# Patient Record
Sex: MALE | Race: BLACK OR AFRICAN AMERICAN | Employment: UNEMPLOYED | URBAN - METROPOLITAN AREA
[De-identification: names, ages, dates, MRNs, and addresses within clinical notes are randomized per-mention and may not be internally consistent; named-entity substitution may affect disease eponyms.]

---

## 2017-01-10 ENCOUNTER — OFFICE VISIT (OUTPATIENT)
Dept: RHEUMATOLOGY | Age: 67
End: 2017-01-10

## 2017-01-10 VITALS
WEIGHT: 187.8 LBS | HEIGHT: 68 IN | RESPIRATION RATE: 20 BRPM | HEART RATE: 68 BPM | DIASTOLIC BLOOD PRESSURE: 96 MMHG | OXYGEN SATURATION: 100 % | BODY MASS INDEX: 28.46 KG/M2 | TEMPERATURE: 97.6 F | SYSTOLIC BLOOD PRESSURE: 170 MMHG

## 2017-01-10 DIAGNOSIS — M1A.39X1 CHRONIC GOUT DUE TO RENAL IMPAIRMENT OF MULTIPLE SITES WITH TOPHUS: Primary | ICD-10-CM

## 2017-01-10 DIAGNOSIS — N18.5 CKD (CHRONIC KIDNEY DISEASE) STAGE 5, GFR LESS THAN 15 ML/MIN (HCC): ICD-10-CM

## 2017-01-10 DIAGNOSIS — D47.2 MGUS (MONOCLONAL GAMMOPATHY OF UNKNOWN SIGNIFICANCE): ICD-10-CM

## 2017-01-10 DIAGNOSIS — M17.0 PRIMARY OSTEOARTHRITIS OF BOTH KNEES: ICD-10-CM

## 2017-01-10 NOTE — MR AVS SNAPSHOT
Visit Information Date & Time Provider Department Dept. Phone Encounter #  
 1/10/2017  4:00 PM Karen Terry MD [facility] [id number] Follow-up Instructions Return in about 3 months (around 4/10/2017). Upcoming Health Maintenance Date Due Hepatitis C Screening 1950 FOBT Q 1 YEAR AGE 50-75 10/23/1998 ZOSTER VACCINE AGE 60> 10/23/2008 GLAUCOMA SCREENING Q2Y 10/23/2013 Pneumococcal 65+ High/Highest Risk (1 of 2 - PCV13) 10/23/2013 MEDICARE YEARLY EXAM 10/23/2013 INFLUENZA AGE 9 TO ADULT 8/1/2016 DTaP/Tdap/Td series (2 - Td) 9/11/2023 Allergies as of 1/10/2017  Review Complete On: 1/10/2017 By: Keyshawn Newell LPN Severity Noted Reaction Type Reactions Amlodipine  07/23/2013    Swelling Other Medication  05/24/2013    Other (comments) Eye Drop 2002- Caused pain, redness and discomfort Current Immunizations  Reviewed on 10/10/2016 Name Date Tdap 9/11/2013 Not reviewed this visit You Were Diagnosed With   
  
 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   
 CKD (chronic kidney disease) stage 5, GFR less than 15 ml/min ([facility])     ICD-10-CM: N18.5 ICD-9-CM: 585.5 MGUS (monoclonal gammopathy of unknown significance)     ICD-10-CM: A39.7 ICD-9-CM: 273.1 Primary osteoarthritis of both knees     ICD-10-CM: M17.0 ICD-9-CM: 715.16 Vitals BP Pulse Temp Resp Height(growth percentile) Weight(growth percentile) (!) 170/96 (BP 1 Location: Left arm, BP Patient Position: Sitting) 68 97.6 °F (36.4 °C) (Oral) 20 5' 8\" (1.727 m) 187 lb 12.8 oz (85.2 kg) SpO2 BMI Smoking Status 100% 28.55 kg/m2 Former Smoker Vitals History BMI and BSA Data Body Mass Index Body Surface Area 28.55 kg/m 2 2.02 m 2 Preferred Pharmacy Pharmacy Name Phone [facility] [address] [facility] [phone] Your Updated Medication List  
  
   
This list is accurate as of: 1/10/17  4:17 PM.  Always use your most recent med list.  
  
  
  
  
 ascorbic acid (vitamin C) 500 mg tablet Commonly known as:  VITAMIN C Take 1,500 mg by mouth daily. bumetanide 1 mg tablet Commonly known as:  Sarina Achilles TAKE ONE TABLET BY MOUTH ONCE DAILY FOR BLOOD PRESSURE  
  
 cholecalciferol 1,000 unit tablet Commonly known as:  VITAMIN D3 Take 5,000 Units by mouth daily. COREG 12.5 mg tablet Generic drug:  carvedilol Take 25 mg by mouth two (2) times daily (with meals). febuxostat 40 mg Tab tablet Commonly known as:  Jacinda Liter Take 40 mg by mouth daily. simvastatin 40 mg tablet Commonly known as:  ZOCOR  
TAKE ONE TABLET BY MOUTH NIGHTLY We Performed the Following URIC ACID [id number] CPT(R)] Follow-up Instructions Return in about 3 months (around 4/10/2017). Introducing [facility]! Dear Daniel El: 
Thank you for requesting a [employer] account. Our records indicate that you already have an active Kirom[employer] account. You can access your account anytime at [email or web address] Did you know that you can access your hospital and ER discharge instructions at any time in Ki[employer]ic? You can also review all of your test results from your hospital stay or ER visit. Additional Information If you have questions, please visit the Frequently Asked Questions section of the Kirom[employer] website at [email or web address] Remember, Ki[employer]ic is NOT to be used for urgent needs. For medical emergencies, dial 911. Now available from your iPhone and Android! Please provide this summary of care documentation to your next provider. Your primary care clinician is listed as Vivien Saenz.  If you have any questions after today's visit, please call [phone].

## 2017-01-10 NOTE — PROGRESS NOTES
REASON FOR VISIT    This is a follow-up visit for Mr. Caleb Reyes for gouty arthritis. 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 Caleb Reyes returns for a follow-up visit. Most recent uric acid from 10/10/2016 was 4.2 mg/dL (previously 4.2, 3.8 mg/dL). He has had not flare since his last visit. He continues on Uloric 40 mg daily . The patient has not had any interval hospital admissions, infections, or surgeries. REVIEW OF SYSTEMS    A comprehensive review of systems was performed and pertinent results are documented in the HPI, review of systems is otherwise non-contributory. PAST MEDICAL HISTORY    He has a past medical history of Anemia; Benign hypertensive renal disease (3/18/16); Chronic kidney disease, stage III (moderate) (3/18/16); Cold sore; Gout (2013); Gout; Hiatal hernia; Hyperlipidemia; Hypertension; MGUS (monoclonal gammopathy of unknown significance) (3/22/2016); Persistent proteinuria; Prediabetes; Proteinuria (3/18/16); Right knee pain (4/13/16); and Vitamin D deficiency.     FAMILY HISTORY    His family history includes Dementia (age of onset: 80) in his mother; Hypertension in his father; Stroke (age of onset: 64) in his father. SOCIAL HISTORY    He reports that he quit smoking about 30 years ago. His smoking use included Cigarettes. He quit after 3.00 years of use. He has never used smokeless tobacco. He reports that he does not drink alcohol or use illicit drugs. IMMUNIZATIONS  Immunization History   Administered Date(s) Administered    Tdap 09/11/2013       MEDICATIONS    Current Outpatient Prescriptions   Medication Sig Dispense Refill    carvedilol (COREG) 12.5 mg tablet Take 25 mg by mouth two (2) times daily (with meals).  simvastatin (ZOCOR) 40 mg tablet TAKE ONE TABLET BY MOUTH NIGHTLY 90 Tab 0    bumetanide (BUMEX) 1 mg tablet TAKE ONE TABLET BY MOUTH ONCE DAILY FOR BLOOD PRESSURE (Patient taking differently: patient takes 3 times  a week) 30 Tab 5    febuxostat (ULORIC) 40 mg tab tablet Take 40 mg by mouth daily.  cholecalciferol (VITAMIN D3) 1,000 unit tablet Take 5,000 Units by mouth daily.  ascorbic acid (VITAMIN C) 500 mg tablet Take 1,500 mg by mouth daily. ALLERGIES    Allergies   Allergen Reactions    Amlodipine Swelling    Other Medication Other (comments)     Eye Drop 2002- Caused pain, redness and discomfort       PHYSICAL EXAMINATION    Visit Vitals    BP (!) 170/96 (BP 1 Location: Left arm, BP Patient Position: Sitting)    Pulse 68    Temp 97.6 °F (36.4 °C) (Oral)    Resp 20    Ht 5' 8\" (1.727 m)    Wt 187 lb 12.8 oz (85.2 kg)    SpO2 100%    BMI 28.55 kg/m2     Body mass index is 28.55 kg/(m^2). General: Patient is alert, oriented x 3, not in acute distress    HEENT:   Sclerae are not injected and appear moist.  Oral mucous membranes are moist, there are no ulcers present. There is no alopecia. Neck is supple, there is no lymphadenopathy. Cardiovascular:  Heart is regular rate and rhythm, no murmurs. Chest:  Lungs are clear to auscultation bilaterally.  No rhonchi, wheezes, or crackles. Abdomen:  Soft, non-tender. Extremities:  Free of clubbing, cyanosis, edema    Neurological exam:  No focal sensory deficits, muscle strength is full in upper and lower extremities     Skin exam:  There are no rashes, no alopecia, no tophi, no discoid lesions, no active Raynaud's, no livedo reticularis, no periungual erythema. Musculoskeletal exam:  A comprehensive musculoskeletal exam was performed for all joints of each upper and lower extremity and assessed for swelling, tenderness and range of motion. Positive results are documented as below:     No synovitis. DATA REVIEW    Laboratory    The following laboratory results were reviewed and discussed with the patient:    Abstract on 12/08/2016   Component Date Value    Creatinine, External 10/27/2016 4.27    Abstract on 10/27/2016   Component Date Value    Creatinine, External 09/20/2016 4.30    Office Visit on 10/10/2016   Component Date Value    Uric acid 10/10/2016 4.2        Imaging    Musculoskeletal Ultrasound    Ultrasound of the right 1st toe. Indication: joint pain. (10/10/2016)  Using a GE Logiq e with 18 Mhz probe, limited views of the right 1st MTP dorsolateral were obtained. This revealed hypoechoic inhomogenous circumscribed collection with acoustic shadowing within the joint space. No doppler. The tendons were normal. Bony contours were regular dorsally but irregularly on lateral view with an periartiuclar erosion seen. No double contour sign. Impression: tophaceous erosive gout     Ultrasound of the left 1st toe. Indication: joint pain. (10/10/2016)  Using a GE Logiq e with 18 Mhz probe, limited views of the left 1st MTP were obtained. This revealed a hypoechoic inhomogenous collection within the joint space extending laterally.  The tendons were normal. Bony contours were irregular with an hyperechoic extension from the dorsal lockhart of the 1st MTP with a periarticular erosion seen on lateral view. There was a double contour sign seen on lateral view. Impression: tophaceous erosive gout with possibly calcified tophi     Ultrasound of the right knee. Indication: joint swelling. (10/10/2016)  Using a Ocho Globaliq e with 12 Mhz probe, limited views of the suprapatellar were obtained. This revealed no abnormal within the joint space. The tendons were normal.Patellar bony contours were irregular. There were no soft tissue masses noted. No double contour sign  Impression: degenerative patella. Radiographs    Right Knee 4/17/2016: small joint effusion and prepatellar soft tissue swelling with no acute fracture. Right Knee 4/12/2016: spurring/enthesopathy appearance of the superior patellar pole new since 2006, with regional soft tissue swelling suggested. There is no apparent fracture or effusion. There is mild osteoarthrosis. Small intra-articular loose body is possible. CT Imaging    None    MR Imaging    None    DXA     None    Ultrasound    Renal Utrasound 3/23/2016: evidence of chronic medical renal disease. Multiple bilateral renal cysts. No hydronephrosis. Other Imaging    Skeletal Survey 11/01/2010: Minimal to mild osteopenia. Mild DJD. No focal findings    PATHOLOGY    Bone Marrow Biopsy from the right iliac crest biopsy and aspiration 3/01/2016: Normocellular marrow with trilineage hematopoiesis. No morphologic or flow cytometric evidence for malignancy or high grade dysplasia. Storage iron present without pathologic erythroid iron. Normal male karyotype. ASSESSMENT AND PLAN    This is a follow-up visit for Mr. Janessa Hunt. 1) Chronic Tophaceous Erosive Gout. His uric acid was 4.2 mg/dL in 9/19/2016 (previously 4.2, 3.8 mg/dL). He is doing well on Uloric 40 mg daily. No interval flares. I will check his uric acid level today.    2) CKD Stage V. He follows with Dr. Gisela Marroquin. His renal function is worsening and will need dialysis. See #1.     3) Monoclonal Gammopathy of Undetermined Significance. He follows with Dr. Mark Anthony Cerna.    4) Proteinuria. See #2. 5) Bilateral Knee Osteoarthritis. This was not an active issue today. The patient voiced understanding of the aforementioned assessment and plan. Summary of plan was provided in the After Visit Summary patient instructions.      TODAY'S ORDERS    Orders Placed This Encounter    URIC ACID     Future Appointments  Date Time Provider Mali Harrison   4/11/2017 4:00 PM MD Sadaf Munroe MD, [address] [facility]    Adult Rheumatology   Musculoskeletal Ultrasound Certified  [address] [place] [address], [address] [facility]   Phone [phone]  Fax [phone]

## 2017-01-11 LAB — URATE SERPL-MCNC: 3.8 MG/DL (ref 3.7–8.6)

## 2017-01-19 LAB — CREATININE, EXTERNAL: 5.29

## 2017-01-31 ENCOUNTER — TELEPHONE (OUTPATIENT)
Dept: SURGERY | Age: 67
End: 2017-01-31

## 2017-01-31 DIAGNOSIS — Z01.818 PRE-OP TESTING: Primary | ICD-10-CM

## 2017-01-31 NOTE — TELEPHONE ENCOUNTER
Spoke with Mr Christopher He. Dr Miguel Monterroso spoke with Dr Sergio Johns. Called pt to schedule surgery for laparoscopic insertion of peritoneal dialysis catheter. He will check with work & confirm date. Pt also needs EKG, order placed & he will go by 2/10/17.

## 2017-02-01 ENCOUNTER — HOSPITAL ENCOUNTER (OUTPATIENT)
Dept: NON INVASIVE DIAGNOSTICS | Age: 67
Discharge: HOME | End: 2017-02-01
Payer: MEDICARE

## 2017-02-01 DIAGNOSIS — Z01.818 PRE-OP TESTING: ICD-10-CM

## 2017-02-01 PROCEDURE — 93005 ELECTROCARDIOGRAM TRACING: CPT

## 2017-02-02 LAB
ATRIAL RATE: 66 BPM
CALCULATED P AXIS, ECG09: 59 DEGREES
CALCULATED R AXIS, ECG10: 1 DEGREES
CALCULATED T AXIS, ECG11: -5 DEGREES
DIAGNOSIS, 93000: NORMAL
P-R INTERVAL, ECG05: 158 MS
Q-T INTERVAL, ECG07: 394 MS
QRS DURATION, ECG06: 84 MS
QTC CALCULATION (BEZET), ECG08: 413 MS
VENTRICULAR RATE, ECG03: 66 BPM

## 2017-02-02 NOTE — TELEPHONE ENCOUNTER
Received a med refill request from pt's [address]  for med Simvastatin 40mg. I called the pt to check and see if he is still a pt here at [facility] practice since he was last seen by Dr. Jess Ramírez in 02/2016. Pt states that he is still a pt here at [facility] and has switched to see Dr. Lisa Melchor but has not scheduled an appt yet since he has been seeing different specialists due to CKD. He will call and schedule an appt soon. I did inform pt that the office had received a med refill request for Simvastatin and rx may or may not get approved by Dr. Mark Mendoza since he hasn't been seen and haven't had a lipid panel test performed. I did inform pt that I will submit the request to Dr. Mark Mendoza and will give him a call if med is not approved. Pt verbalized understanding with no further questions or concerns. Pt's last OV was 02/16/16 with no future appt scheduled.

## 2017-02-03 NOTE — TELEPHONE ENCOUNTER
Spoke with Mr White Ying. He is requesting surgery date of 2/24/17. EKG done, will schedule surgery, post op appt scheduled for Monday, 2/27/17.

## 2017-02-09 RX ORDER — SIMVASTATIN 40 MG/1
TABLET, FILM COATED ORAL
Qty: 90 TAB | Refills: 0 | Status: SHIPPED | OUTPATIENT
Start: 2017-02-09 | End: 2017-07-13 | Stop reason: SDUPTHER

## 2017-02-22 RX ORDER — CLONIDINE HYDROCHLORIDE 0.1 MG/1
0.2 TABLET ORAL
COMMUNITY
End: 2018-08-16 | Stop reason: SDUPTHER

## 2017-02-22 NOTE — PERIOP NOTES
[facility]  Preoperative Instructions        Surgery Date 2/24/17          Time of Arrival 0900    1. On the day of your surgery, please report to the Surgical Services Registration Desk and sign in at your designated time. The Surgery Center is located to the right of the Emergency Room. 2. You must have someone with you to drive you home. You should not drive a car for 24 hours following surgery. Please make arrangements for a friend or family member to stay with you for the first 24 hours after your surgery. 3. Do not have anything to eat or drink (including water, gum, mints, coffee, juice) after midnight 2/23/17              . This may not apply to medications prescribed by your physician. Please note special instructions, if applicable. If you are currently taking Plavix, Coumadin, or other blood-thinning agents, contact your surgeon for instructions. 4. We recommend you do not drink any alcoholic beverages for 24 hours before and after your surgery. 5. Have a list of all current medications, including vitamins, herbal supplements and any other over the counter medications. Stop all Aspirin and non-steroidal anti-inflammatory drugs (I.e. Advil, Aleve), as directed by your surgeon's office. Stop all vitamins and herbal supplements seven days prior to your surgery. 6. Wear comfortable clothes. Wear glasses instead of contacts. Do not bring any money or jewelry. Please bring picture ID, insurance card, and any prearranged co-payment or hospital payment. Do not wear make-up, particularly mascara the morning of your surgery. Do not wear nail polish, particularly if you are having foot /hand surgery. Wear your hair loose or down, no ponytails, buns, june pins or clips. All body piercings must be removed.   Please shower with antibacterial soap for three consecutive days before and on the morning of surgery, but do not apply any lotions, powders or deodorants after the shower on the day of surgery. Please use a fresh towels after each shower. Please sleep in clean clothes and change bed linens the night before surgery. Please do not shave for 48 hours prior to surgery. Shaving of the face is acceptable. 7. You should understand that if you do not follow these instructions your surgery may be cancelled. If your physical condition changes (I.e. fever, cold or flu) please contact your surgeon as soon as possible. 8. It is important that you be on time. If a situation occurs where you may be late, please call [phone] (OR Holding Area). 9. If you have any questions and or problems, please call [phone] (Pre-admission Testing). 10. Your surgery time may be subject to change. You will receive a phone call the evening prior if your time changes. 11.  If having outpatient surgery, you must have someone to drive you here, stay with you during the duration of your stay, and to drive you home at time of discharge. Special Instructions:    MEDICATIONS TO TAKE THE MORNING OF SURGERY WITH A SIP OF WATER:Clonidine,Coreg      I understand a pre-operative phone call will be made to verify my surgery time. In the event that I am not available, I give permission for a message to be left on my answering service and/or with another person?   Yes @ [phone]         ___________________      __________   _________    (Signature of Patient)             (Witness)                (Date and Time)

## 2017-02-24 ENCOUNTER — ANESTHESIA EVENT (OUTPATIENT)
Dept: SURGERY | Age: 67
End: 2017-02-24
Payer: MEDICARE

## 2017-02-24 ENCOUNTER — ANESTHESIA (OUTPATIENT)
Dept: SURGERY | Age: 67
End: 2017-02-24
Payer: MEDICARE

## 2017-02-24 ENCOUNTER — HOSPITAL ENCOUNTER (OUTPATIENT)
Age: 67
Setting detail: OUTPATIENT SURGERY
Discharge: HOME | End: 2017-02-24
Attending: SURGERY | Admitting: SURGERY
Payer: MEDICARE

## 2017-02-24 VITALS
WEIGHT: 184.97 LBS | SYSTOLIC BLOOD PRESSURE: 168 MMHG | TEMPERATURE: 98.2 F | HEIGHT: 68 IN | DIASTOLIC BLOOD PRESSURE: 81 MMHG | HEART RATE: 64 BPM | RESPIRATION RATE: 14 BRPM | OXYGEN SATURATION: 99 % | BODY MASS INDEX: 28.03 KG/M2

## 2017-02-24 LAB
ANION GAP BLD CALC-SCNC: 17 MMOL/L (ref 5–15)
BUN BLD-MCNC: 72 MG/DL (ref 9–20)
CA-I BLD-MCNC: 1.1 MMOL/L (ref 1.12–1.32)
CHLORIDE BLD-SCNC: 109 MMOL/L (ref 98–107)
CO2 BLD-SCNC: 21 MMOL/L (ref 21–32)
CREAT BLD-MCNC: 6.9 MG/DL (ref 0.6–1.3)
GLUCOSE BLD-MCNC: 100 MG/DL (ref 75–110)
HCT VFR BLD CALC: 33 % (ref 36.6–50.3)
HGB BLD-MCNC: 11.2 GM/DL (ref 12.1–17)
POTASSIUM BLD-SCNC: 5.7 MMOL/L (ref 3.5–5.1)
SERVICE CMNT-IMP: ABNORMAL
SODIUM BLD-SCNC: 140 MMOL/L (ref 136–145)

## 2017-02-24 PROCEDURE — 74011250636 HC RX REV CODE- 250/636: Performed by: ANESTHESIOLOGY

## 2017-02-24 PROCEDURE — 76210000016 HC OR PH I REC 1 TO 1.5 HR: Performed by: SURGERY

## 2017-02-24 PROCEDURE — 77030013079 HC BLNKT BAIR HGGR 3M -A: Performed by: NURSE ANESTHETIST, CERTIFIED REGISTERED

## 2017-02-24 PROCEDURE — 77030002888 HC SUT CHRMC J&J -A: Performed by: SURGERY

## 2017-02-24 PROCEDURE — 77030031139 HC SUT VCRL2 J&J -A: Performed by: SURGERY

## 2017-02-24 PROCEDURE — 77030035051: Performed by: SURGERY

## 2017-02-24 PROCEDURE — 77030026438 HC STYL ET INTUB CARD -A: Performed by: NURSE ANESTHETIST, CERTIFIED REGISTERED

## 2017-02-24 PROCEDURE — 76060000034 HC ANESTHESIA 1.5 TO 2 HR: Performed by: SURGERY

## 2017-02-24 PROCEDURE — 77030011640 HC PAD GRND REM COVD -A: Performed by: SURGERY

## 2017-02-24 PROCEDURE — 77030035048 HC TRCR ENDOSC OPTCL COVD -B: Performed by: SURGERY

## 2017-02-24 PROCEDURE — 77030002986 HC SUT PROL J&J -A: Performed by: SURGERY

## 2017-02-24 PROCEDURE — 77030020255 HC SOL INJ LR 1000ML BG: Performed by: SURGERY

## 2017-02-24 PROCEDURE — C1750 CATH, HEMODIALYSIS,LONG-TERM: HCPCS | Performed by: SURGERY

## 2017-02-24 PROCEDURE — 77030020782 HC GWN BAIR PAWS FLX 3M -B

## 2017-02-24 PROCEDURE — 76210000020 HC REC RM PH II FIRST 0.5 HR: Performed by: SURGERY

## 2017-02-24 PROCEDURE — 77030020788: Performed by: SURGERY

## 2017-02-24 PROCEDURE — 74011250636 HC RX REV CODE- 250/636: Performed by: SURGERY

## 2017-02-24 PROCEDURE — 74011250636 HC RX REV CODE- 250/636

## 2017-02-24 PROCEDURE — 74011000250 HC RX REV CODE- 250

## 2017-02-24 PROCEDURE — 77030032490 HC SLV COMPR SCD KNE COVD -B: Performed by: SURGERY

## 2017-02-24 PROCEDURE — 77030008771 HC TU NG SALEM SUMP -A: Performed by: NURSE ANESTHETIST, CERTIFIED REGISTERED

## 2017-02-24 PROCEDURE — 77030008684 HC TU ET CUF COVD -B: Performed by: NURSE ANESTHETIST, CERTIFIED REGISTERED

## 2017-02-24 PROCEDURE — 77030004495 HC ADPT PERI DLYS BAXT -C: Performed by: SURGERY

## 2017-02-24 PROCEDURE — 77030019908 HC STETH ESOPH SIMS -A: Performed by: NURSE ANESTHETIST, CERTIFIED REGISTERED

## 2017-02-24 PROCEDURE — 80047 BASIC METABLC PNL IONIZED CA: CPT

## 2017-02-24 PROCEDURE — 76010000153 HC OR TIME 1.5 TO 2 HR: Performed by: SURGERY

## 2017-02-24 PROCEDURE — 74011000250 HC RX REV CODE- 250: Performed by: SURGERY

## 2017-02-24 RX ORDER — ATRACURIUM BESYLATE 10 MG/ML
INJECTION, SOLUTION INTRAVENOUS AS NEEDED
Status: DISCONTINUED | OUTPATIENT
Start: 2017-02-24 | End: 2017-02-24 | Stop reason: HOSPADM

## 2017-02-24 RX ORDER — ONDANSETRON 2 MG/ML
INJECTION INTRAMUSCULAR; INTRAVENOUS AS NEEDED
Status: DISCONTINUED | OUTPATIENT
Start: 2017-02-24 | End: 2017-02-24 | Stop reason: HOSPADM

## 2017-02-24 RX ORDER — OXYCODONE AND ACETAMINOPHEN 5; 325 MG/1; MG/1
1-2 TABLET ORAL
Qty: 35 TAB | Refills: 0 | Status: SHIPPED | OUTPATIENT
Start: 2017-02-24 | End: 2017-05-27

## 2017-02-24 RX ORDER — MIDAZOLAM HYDROCHLORIDE 1 MG/ML
0.5 INJECTION, SOLUTION INTRAMUSCULAR; INTRAVENOUS
Status: DISCONTINUED | OUTPATIENT
Start: 2017-02-24 | End: 2017-02-24 | Stop reason: HOSPADM

## 2017-02-24 RX ORDER — LIDOCAINE HYDROCHLORIDE 20 MG/ML
INJECTION, SOLUTION EPIDURAL; INFILTRATION; INTRACAUDAL; PERINEURAL AS NEEDED
Status: DISCONTINUED | OUTPATIENT
Start: 2017-02-24 | End: 2017-02-24 | Stop reason: HOSPADM

## 2017-02-24 RX ORDER — FENTANYL CITRATE 50 UG/ML
INJECTION, SOLUTION INTRAMUSCULAR; INTRAVENOUS AS NEEDED
Status: DISCONTINUED | OUTPATIENT
Start: 2017-02-24 | End: 2017-02-24 | Stop reason: HOSPADM

## 2017-02-24 RX ORDER — MIDAZOLAM HYDROCHLORIDE 1 MG/ML
INJECTION, SOLUTION INTRAMUSCULAR; INTRAVENOUS AS NEEDED
Status: DISCONTINUED | OUTPATIENT
Start: 2017-02-24 | End: 2017-02-24 | Stop reason: HOSPADM

## 2017-02-24 RX ORDER — SODIUM CHLORIDE 9 MG/ML
25 INJECTION, SOLUTION INTRAVENOUS CONTINUOUS
Status: DISCONTINUED | OUTPATIENT
Start: 2017-02-24 | End: 2017-02-24 | Stop reason: HOSPADM

## 2017-02-24 RX ORDER — NEOSTIGMINE METHYLSULFATE 1 MG/ML
INJECTION INTRAVENOUS AS NEEDED
Status: DISCONTINUED | OUTPATIENT
Start: 2017-02-24 | End: 2017-02-24 | Stop reason: HOSPADM

## 2017-02-24 RX ORDER — SODIUM CHLORIDE 0.9 % (FLUSH) 0.9 %
5-10 SYRINGE (ML) INJECTION EVERY 8 HOURS
Status: DISCONTINUED | OUTPATIENT
Start: 2017-02-24 | End: 2017-02-24 | Stop reason: HOSPADM

## 2017-02-24 RX ORDER — MIDAZOLAM HYDROCHLORIDE 1 MG/ML
1 INJECTION, SOLUTION INTRAMUSCULAR; INTRAVENOUS AS NEEDED
Status: DISCONTINUED | OUTPATIENT
Start: 2017-02-24 | End: 2017-02-24 | Stop reason: HOSPADM

## 2017-02-24 RX ORDER — ROPIVACAINE HYDROCHLORIDE 5 MG/ML
30 INJECTION, SOLUTION EPIDURAL; INFILTRATION; PERINEURAL AS NEEDED
Status: DISCONTINUED | OUTPATIENT
Start: 2017-02-24 | End: 2017-02-24 | Stop reason: HOSPADM

## 2017-02-24 RX ORDER — ACETAMINOPHEN 10 MG/ML
1000 INJECTION, SOLUTION INTRAVENOUS ONCE
Status: COMPLETED | OUTPATIENT
Start: 2017-02-24 | End: 2017-02-24

## 2017-02-24 RX ORDER — PROPOFOL 10 MG/ML
INJECTION, EMULSION INTRAVENOUS AS NEEDED
Status: DISCONTINUED | OUTPATIENT
Start: 2017-02-24 | End: 2017-02-24 | Stop reason: HOSPADM

## 2017-02-24 RX ORDER — SODIUM CHLORIDE 0.9 % (FLUSH) 0.9 %
5-10 SYRINGE (ML) INJECTION AS NEEDED
Status: DISCONTINUED | OUTPATIENT
Start: 2017-02-24 | End: 2017-02-24 | Stop reason: HOSPADM

## 2017-02-24 RX ORDER — HYDRALAZINE HYDROCHLORIDE 20 MG/ML
5 INJECTION INTRAMUSCULAR; INTRAVENOUS ONCE
Status: COMPLETED | OUTPATIENT
Start: 2017-02-24 | End: 2017-02-24

## 2017-02-24 RX ORDER — FENTANYL CITRATE 50 UG/ML
25 INJECTION, SOLUTION INTRAMUSCULAR; INTRAVENOUS
Status: DISCONTINUED | OUTPATIENT
Start: 2017-02-24 | End: 2017-02-24 | Stop reason: HOSPADM

## 2017-02-24 RX ORDER — SODIUM CHLORIDE, SODIUM LACTATE, POTASSIUM CHLORIDE, CALCIUM CHLORIDE 600; 310; 30; 20 MG/100ML; MG/100ML; MG/100ML; MG/100ML
100 INJECTION, SOLUTION INTRAVENOUS CONTINUOUS
Status: DISCONTINUED | OUTPATIENT
Start: 2017-02-24 | End: 2017-02-24 | Stop reason: HOSPADM

## 2017-02-24 RX ORDER — DIPHENHYDRAMINE HYDROCHLORIDE 50 MG/ML
12.5 INJECTION, SOLUTION INTRAMUSCULAR; INTRAVENOUS AS NEEDED
Status: DISCONTINUED | OUTPATIENT
Start: 2017-02-24 | End: 2017-02-24 | Stop reason: HOSPADM

## 2017-02-24 RX ORDER — HYDRALAZINE HYDROCHLORIDE 20 MG/ML
INJECTION INTRAMUSCULAR; INTRAVENOUS
Status: COMPLETED
Start: 2017-02-24 | End: 2017-02-24

## 2017-02-24 RX ORDER — HYDROMORPHONE HYDROCHLORIDE 1 MG/ML
0.5 INJECTION, SOLUTION INTRAMUSCULAR; INTRAVENOUS; SUBCUTANEOUS
Status: DISCONTINUED | OUTPATIENT
Start: 2017-02-24 | End: 2017-02-24 | Stop reason: HOSPADM

## 2017-02-24 RX ORDER — ONDANSETRON 2 MG/ML
4 INJECTION INTRAMUSCULAR; INTRAVENOUS AS NEEDED
Status: DISCONTINUED | OUTPATIENT
Start: 2017-02-24 | End: 2017-02-24 | Stop reason: HOSPADM

## 2017-02-24 RX ORDER — MORPHINE SULFATE 10 MG/ML
2 INJECTION, SOLUTION INTRAMUSCULAR; INTRAVENOUS
Status: DISCONTINUED | OUTPATIENT
Start: 2017-02-24 | End: 2017-02-24 | Stop reason: HOSPADM

## 2017-02-24 RX ORDER — GLYCOPYRROLATE 0.2 MG/ML
INJECTION INTRAMUSCULAR; INTRAVENOUS AS NEEDED
Status: DISCONTINUED | OUTPATIENT
Start: 2017-02-24 | End: 2017-02-24 | Stop reason: HOSPADM

## 2017-02-24 RX ORDER — PHENYLEPHRINE HCL IN 0.9% NACL 0.4MG/10ML
SYRINGE (ML) INTRAVENOUS AS NEEDED
Status: DISCONTINUED | OUTPATIENT
Start: 2017-02-24 | End: 2017-02-24 | Stop reason: HOSPADM

## 2017-02-24 RX ORDER — ACETAMINOPHEN 10 MG/ML
INJECTION, SOLUTION INTRAVENOUS
Status: COMPLETED
Start: 2017-02-24 | End: 2017-02-24

## 2017-02-24 RX ORDER — FENTANYL CITRATE 50 UG/ML
50 INJECTION, SOLUTION INTRAMUSCULAR; INTRAVENOUS AS NEEDED
Status: DISCONTINUED | OUTPATIENT
Start: 2017-02-24 | End: 2017-02-24 | Stop reason: HOSPADM

## 2017-02-24 RX ORDER — CEFAZOLIN SODIUM IN 0.9 % NACL 2 G/100 ML
2 PLASTIC BAG, INJECTION (ML) INTRAVENOUS ONCE
Status: COMPLETED | OUTPATIENT
Start: 2017-02-24 | End: 2017-02-24

## 2017-02-24 RX ORDER — LIDOCAINE HYDROCHLORIDE 10 MG/ML
0.1 INJECTION, SOLUTION EPIDURAL; INFILTRATION; INTRACAUDAL; PERINEURAL AS NEEDED
Status: DISCONTINUED | OUTPATIENT
Start: 2017-02-24 | End: 2017-02-24 | Stop reason: HOSPADM

## 2017-02-24 RX ADMIN — ONDANSETRON 4 MG: 2 INJECTION INTRAMUSCULAR; INTRAVENOUS at 12:23

## 2017-02-24 RX ADMIN — GLYCOPYRROLATE 0.2 MG: 0.2 INJECTION INTRAMUSCULAR; INTRAVENOUS at 12:14

## 2017-02-24 RX ADMIN — FENTANYL CITRATE 50 MCG: 50 INJECTION, SOLUTION INTRAMUSCULAR; INTRAVENOUS at 11:33

## 2017-02-24 RX ADMIN — ATRACURIUM BESYLATE 5 MG: 10 INJECTION, SOLUTION INTRAVENOUS at 11:33

## 2017-02-24 RX ADMIN — MIDAZOLAM HYDROCHLORIDE 2 MG: 1 INJECTION, SOLUTION INTRAMUSCULAR; INTRAVENOUS at 11:21

## 2017-02-24 RX ADMIN — Medication 80 MCG: at 11:47

## 2017-02-24 RX ADMIN — LIDOCAINE HYDROCHLORIDE 60 MG: 20 INJECTION, SOLUTION EPIDURAL; INFILTRATION; INTRACAUDAL; PERINEURAL at 11:33

## 2017-02-24 RX ADMIN — ATRACURIUM BESYLATE 5 MG: 10 INJECTION, SOLUTION INTRAVENOUS at 11:44

## 2017-02-24 RX ADMIN — ATRACURIUM BESYLATE 15 MG: 10 INJECTION, SOLUTION INTRAVENOUS at 11:34

## 2017-02-24 RX ADMIN — SODIUM CHLORIDE 25 ML/HR: 900 INJECTION, SOLUTION INTRAVENOUS at 10:09

## 2017-02-24 RX ADMIN — ATRACURIUM BESYLATE 5 MG: 10 INJECTION, SOLUTION INTRAVENOUS at 11:41

## 2017-02-24 RX ADMIN — HYDROMORPHONE HYDROCHLORIDE 0.5 MG: 1 INJECTION, SOLUTION INTRAMUSCULAR; INTRAVENOUS; SUBCUTANEOUS at 13:29

## 2017-02-24 RX ADMIN — FENTANYL CITRATE 25 MCG: 50 INJECTION, SOLUTION INTRAMUSCULAR; INTRAVENOUS at 13:40

## 2017-02-24 RX ADMIN — HYDRALAZINE HYDROCHLORIDE 5 MG: 20 INJECTION INTRAMUSCULAR; INTRAVENOUS at 13:30

## 2017-02-24 RX ADMIN — PROPOFOL 50 MG: 10 INJECTION, EMULSION INTRAVENOUS at 11:33

## 2017-02-24 RX ADMIN — GLYCOPYRROLATE 0.4 MG: 0.2 INJECTION INTRAMUSCULAR; INTRAVENOUS at 12:35

## 2017-02-24 RX ADMIN — PROPOFOL 40 MG: 10 INJECTION, EMULSION INTRAVENOUS at 11:35

## 2017-02-24 RX ADMIN — FENTANYL CITRATE 25 MCG: 50 INJECTION, SOLUTION INTRAMUSCULAR; INTRAVENOUS at 13:35

## 2017-02-24 RX ADMIN — FENTANYL CITRATE 50 MCG: 50 INJECTION, SOLUTION INTRAMUSCULAR; INTRAVENOUS at 11:55

## 2017-02-24 RX ADMIN — CEFAZOLIN 2 G: 10 INJECTION, POWDER, FOR SOLUTION INTRAVENOUS; PARENTERAL at 11:39

## 2017-02-24 RX ADMIN — ATRACURIUM BESYLATE 10 MG: 10 INJECTION, SOLUTION INTRAVENOUS at 11:58

## 2017-02-24 RX ADMIN — ACETAMINOPHEN 1000 MG: 10 INJECTION, SOLUTION INTRAVENOUS at 13:49

## 2017-02-24 RX ADMIN — NEOSTIGMINE METHYLSULFATE 3 MG: 1 INJECTION INTRAVENOUS at 12:35

## 2017-02-24 RX ADMIN — Medication 40 MCG: at 12:08

## 2017-02-24 RX ADMIN — FENTANYL CITRATE 25 MCG: 50 INJECTION, SOLUTION INTRAMUSCULAR; INTRAVENOUS at 12:43

## 2017-02-24 NOTE — PERIOP NOTES
Handoff Report from Operating Room to PACU    Report received from Shikha Orona CRNA and Sae Thorne RN regarding Pratik Burton. Surgeon(s):  Lennox Hoist, MD  And Procedure(s) (LRB):   LAPAROSCOPIC INSERTION  PERITONEAL DIALYSIS CATHETER  (N/A)  confirmed   with allergies and dressings discussed. Anesthesia type, drugs, patient history, complications, estimated blood loss, vital signs, intake and output, and last pain medication, lines, reversal medications and temperature were reviewed.

## 2017-02-24 NOTE — H&P
Surgery    The patient was seen and examined on 2/24/2017. There were no changes from the office History and Physical of 11/29/2016. That note is as follows:      Subjective: The patient is a 74 y.o. man referred by Dr. Sharmila Swain regarding dialysis access. The patient has expressed an interest in peritoneal dialysis. The patient is not presently on dialysis.      Past Medical History: The patient has a history of anemia, gout, hiatal hernia, hyperlipidemia, hypertension, monoclonal gammopathy, prediabetes. The patient is a former smoker quitting in 1987. The patient does not use alcohol. The patient is currently employed.      The patient does not have a history of previous abdominal surgery except for repair of right inguinal hernia at the age of 4.     The patient denies anginal chest pain or irregular heart beat. He has no history of MI or coronary intervention. The patient denies shortness of breath at rest or with exertion.      Physical Examination:   GENERAL: Alert man in no acute distress. Melisa Wapella HEAD/NECK: No jaundice, mass or thyromegaly. LUNGS: Clear bilaterally without wheeze, rhonchi or rales. HEART: Regular without murmur, gallop or rub. ABDOMEN: Soft. Nontender with no mass. There is diastasis recti in the epigastrium. There is no evidence of abdominal wall hernia. The patient wears his belt line approximately one inch below the level of the umbilicus.      Impression/Plan: I reviewed with the patient the risks vs benefits of laparoscopic insertion of peritoneal dialysis catheter under general anesthesia. Risks would include bleeding, infection, failure of catheter function, injury to abdominal organs, risk of general anesthesia, .etc.      The patient understands and wishes to proceed. Final Diagnosis: CKD 5.     G. Oralia Raines MD

## 2017-02-24 NOTE — ANESTHESIA POSTPROCEDURE EVALUATION
Post-Anesthesia Evaluation and Assessment    Patient: Bharat Da Silva MRN: [id number]  SSN: [id number]    YOB: 1950  Age: 74 y.o. Sex: male       Cardiovascular Function/Vital Signs  Visit Vitals    /78    Pulse (!) 57    Temp 36.3 °C (97.4 °F)    Resp 12    Ht 5' 7.5\" (1.715 m)    Wt 83.9 kg (184 lb 15.5 oz)    SpO2 100%    BMI 28.54 kg/m2       Patient is status post general anesthesia for Procedure(s):   LAPAROSCOPIC INSERTION  PERITONEAL DIALYSIS CATHETER . Nausea/Vomiting: None    Postoperative hydration reviewed and adequate. Pain:  Pain Scale 1: Numeric (0 - 10) (02/24/17 1400)  Pain Intensity 1: 2 (02/24/17 1400)   Managed    Neurological Status:   Neuro (WDL): Exceptions to WDL (02/24/17 1305)  Neuro  Neurologic State: Drowsy; Eyes open to stimulus; Eyes open to voice (02/24/17 1305)  Orientation Level: Oriented to person;Oriented to place;Oriented to situation (02/24/17 1305)  Cognition: Follows commands (02/24/17 1305)  Speech: Clear (02/24/17 1305)  LUE Motor Response: Purposeful (02/24/17 1305)  LLE Motor Response: Purposeful (02/24/17 1305)  RUE Motor Response: Purposeful (02/24/17 1305)  RLE Motor Response: Purposeful (02/24/17 1305)   At baseline    Mental Status and Level of Consciousness: Arousable    Pulmonary Status:   O2 Device: Room air (02/24/17 1339)   Adequate oxygenation and airway patent    Complications related to anesthesia: None    Post-anesthesia assessment completed.  No concerns    Signed By: Michelle Liu MD     February 24, 2017

## 2017-02-24 NOTE — DISCHARGE INSTRUCTIONS
Discharge Instructions After Insertion of Peritoneal Dialysis Catheter        Keep dressing in place until first office visit. Keep surgical site dry for two weeks. No lifting over fifteen pounds for 1 month. See Dr. Diane Gamboa in the office this coming Monday morning for dressing change. Call to confirm appointment [phone]. Use pain medication as prescribed for pain. Do not drive for two days. Do not drive while taking pain medication. For any problems call Dr. Diane Gamboa    [phone] or [phone] (after office hours). Anais Fajardo MD    A common side effect of anesthesia following surgery is nausea and/or vomiting. In order to decrease symptoms, it is wise to avoid foods that are high in fat, greasy foods, milk products, and spicy foods for the first 24 hours. Acceptable foods for the first 24 hours following surgery include but are not limited to:     soup   broth    toast    crackers    applesauce    bananas    mashed potatoes,   soft or scrambled eggs   oatmeal    jello    It is important to eat when taking your pain medication. This will help to prevent nausea. If possible, please try to time your meals with your medications. It is very important to stay hydrated following surgery. Sip fluids frequently while awake. Avoid acidic drinks such as citrus juices and soda for 24 hours. Carbonated beverages may cause bloating and gas. Acceptable fluids include:    - water (flavor packets may add variety)  - coffee or tea (in moderation)  - Gatorade  - Jerardo-aid  - apple juice  - cranberry juice    You are encouraged to cough and deep breathe every hour when awake. This will help to prevent respiratory complications following anesthesia. You may want to hug a pillow when coughing and sneezing to add additional support to the surgical area and to decrease discomfort if you had abdominal or chest surgery.     If you are discharged home with support stockings, you may remove them after 24 hours. Support stockings are used to help prevent blood clots in the legs following surgery. Please take time to review all of your Home Care Instructions and Medication Information sheets provided in your discharge packet. If you have any questions, please contact your surgeons office. Thank you. Peritoneal Dialysis Catheter Care: Care Instructions  Your Care Instructions    Dialysis does the work of your kidneys when you have kidney failure. It filters wastes and removes extra fluid. It also keeps the right balance of chemicals in your blood. Peritoneal dialysis uses the lining of your belly to filter your blood. Before you start dialysis, your doctor creates a dialysis access. This is the place where the dialysis solution can flow into and out of your body. To make the access, the doctor places a soft tube in your belly. This tube is called a catheter. When you do dialysis, the solution flows into your belly and stays there for several hours. Then you remove it through the catheter. It is important to take care of the catheter and the access area to prevent infection. Follow-up care is a key part of your treatment and safety. Be sure to make and go to all appointments, and call your doctor if you are having problems. It's also a good idea to know your test results and keep a list of the medicines you take. How can you care for yourself at home? Care of the catheter and access  · After the doctor creates your access, keep the bandage dry and clean. Change a dirty or bloody bandage. · Keep your access area clean and dry. Check it every day for signs of infection. · Always clean and dry your catheter and access area right away after you get wet. · Always wash your hands before you touch the catheter. · Fasten or tape the catheter to your body to keep it from catching on your clothes. · Never use scissors or other sharp objects around your catheter.   · Do not use unapproved clamps on your catheter. · Store your dialysis supplies in a cool, dry place. Activity when you have an access  · Do not lift heavy objects. · Do not swim or take a bath unless your doctor tells you it is okay. When should you call for help? Call your doctor now or seek immediate medical care if:  · You have signs of infection, such as:  ¨ Increased pain, swelling, warmth, or redness. ¨ Red streaks leading from the access area. ¨ Pus draining from the access area. ¨ A fever. · You have belly pain. Watch closely for changes in your health, and be sure to contact your doctor if:  · You have nausea and vomiting. · The dialysis fluid looks cloudy or is a different color. · Fluid does not flow through the catheter. Where can you learn more? Go to http://emerson-adalgisa.info/. Enter R310 in the search box to learn more about \"Peritoneal Dialysis Catheter Care: Care Instructions. \"  Current as of: November 20, 2015  Content Version: 11.1  © 5308-8155 [employer]. Care instructions adapted under license by [employer] (which disclaims liability or warranty for this information). If you have questions about a medical condition or this instruction, always ask your healthcare professional. [place] [address] any warranty or liability for your use of this information. DISCHARGE SUMMARY from Nurse    The following personal items are in your possession at time of discharge:    Dental Appliances: None  Visual Aid: Glasses, With patient  Hearing Aids/Status: Does not own  Home Medications: None  Jewelry: None  Clothing: Undergarments, With patient (home clothing)  Other Valuables: Wallet, Eyeglasses  Personal Items Sent to Safe: Denies need to send valuables with security.           PATIENT INSTRUCTIONS:    After general anesthesia or intravenous sedation, for 24 hours or while taking prescription Narcotics:  · Limit your activities  · Do not drive and operate hazardous machinery  · Do not make important personal or business decisions  · Do  not drink alcoholic beverages  · If you have not urinated within 8 hours after discharge, please contact your surgeon on call. Report the following to your surgeon:  · Excessive pain, swelling, redness or odor of or around the surgical area  · Temperature over 100.5  · Nausea and vomiting lasting longer than 4 hours or if unable to take medications  · Any signs of decreased circulation or nerve impairment to extremity: change in color, persistent  numbness, tingling, coldness or increase pain  · Any questions        *  Please give a list of your current medications to your Primary Care Provider. *  Please update this list whenever your medications are discontinued, doses are      changed, or new medications (including over-the-counter products) are added. *  Please carry medication information at all times in case of emergency situations. These are general instructions for a healthy lifestyle:    No smoking/ No tobacco products/ Avoid exposure to second hand smoke    Surgeon General's Warning:  Quitting smoking now greatly reduces serious risk to your health. Obesity, smoking, and sedentary lifestyle greatly increases your risk for illness    A healthy diet, regular physical exercise & weight monitoring are important for maintaining a healthy lifestyle    You may be retaining fluid if you have a history of heart failure or if you experience any of the following symptoms:  Weight gain of 3 pounds or more overnight or 5 pounds in a week, increased swelling in our hands or feet or shortness of breath while lying flat in bed. Please call your doctor as soon as you notice any of these symptoms; do not wait until your next office visit.     Recognize signs and symptoms of STROKE:    F-face looks uneven    A-arms unable to move or move unevenly    S-speech slurred or non-existent    T-time-call 911 as soon as signs and symptoms begin-DO NOT go       Back to bed or wait to see if you get better-TIME IS BRAIN. Warning Signs of HEART ATTACK     Call 911 if you have these symptoms:   Chest discomfort. Most heart attacks involve discomfort in the center of the chest that lasts more than a few minutes, or that goes away and comes back. It can feel like uncomfortable pressure, squeezing, fullness, or pain.  Discomfort in other areas of the upper body. Symptoms can include pain or discomfort in one or both arms, the back, neck, jaw, or stomach.  Shortness of breath with or without chest discomfort.  Other signs may include breaking out in a cold sweat, nausea, or lightheadedness. Don't wait more than five minutes to call 911 - MINUTES MATTER! Fast action can save your life. Calling 911 is almost always the fastest way to get lifesaving treatment. Emergency Medical Services staff can begin treatment when they arrive -- up to an hour sooner than if someone gets to the hospital by car. The discharge information has been reviewed with the patient and spouse. The patient and spouse verbalized understanding. Discharge medications reviewed with the patient and spouse and appropriate educational materials and side effects teaching were provided. Oxycodone/Acetaminophen (By mouth)   Acetaminophen (k-ihxu-r-MIN-oh-fen), Oxycodone Hydrochloride (ro-r-LPX-done tiffanie-droe-KLOR-helen)  Treats moderate to moderately severe pain. This medicine is a narcotic pain reliever. Brand Name(s):Endocet, Percocet, Primlev, Roxicet, Xartemis XR   There may be other brand names for this medicine. When This Medicine Should Not Be Used: This medicine is not right for everyone. Do not use it if you had an allergic reaction to acetaminophen or oxycodone, or if you have serious breathing problems (such as severe asthma, hypercarbia, respiratory depression) or paralytic ileus.   How to Use This Medicine:   Capsule, Liquid, Tablet, Long Acting Tablet  · Your doctor will tell you how much medicine to use. Do not use more than directed. · Measure the oral liquid medicine with a marked measuring spoon, oral syringe, or medicine cup. · Swallow the extended-release tablet whole. Do not crush, break, or chew it. Do not lick or wet the tablet before placing it in your mouth. Do not give this medicine through a feeding tube. · This medicine should come with a Medication Guide. Ask your pharmacist for a copy if you do not have one. · Store the medicine in a closed container at room temperature, away from heat, moisture, and direct light. Ask your pharmacist about the best way to dispose of medicine you do not use. Drugs and Foods to Avoid:   Ask your doctor or pharmacist before using any other medicine, including over-the-counter medicines, vitamins, and herbal products. · Do not use Xartemis XR if you have used an MAO inhibitor in the past 14 days. · Some medicines and foods can affect how this medicine works. Tell your doctor if you are taking any of the following:   ¨ Butorphanol, lamotrigine, nalbuphine, naltrexone, pentazocine, propranolol, zidovudine  ¨ Birth control pills, a diuretic (water pill), muscle relaxants, a phenothiazine medicine (chlorpromazine, perphenazine, promethazine, prochlorperazine, thioridazine)  · Do not drink alcohol while you are using this medicine. Acetaminophen can damage your liver, and alcohol can increase this risk. Do not take acetaminophen without asking your doctor if you have 3 or more drinks of alcohol every day. · Tell your doctor if you are using any medicine that makes you sleepy, such as allergy medicine or other narcotic pain medicine.   Warnings While Using This Medicine:   · Tell your doctor if you are pregnant, or if you have kidney disease, liver disease, heart disease, low blood pressure, breathing problems or lung disease, especially if you have asthma, COPD, cor pulmonale, or kyphoscoliosis (curved spine that causes breathing trouble). Tell your doctor if you have urination problems, an underactive thyroid, Dodge disease, pancreas or prostate problems, or a stomach disorder. Tell your doctor if you have a history of head injury or brain damage, seizures, or alcohol or drug abuse. Tell your doctor if you are allergic to codeine. · Do not breastfeed while you are using this medicine, unless otherwise directed by your doctor. · This medicine may cause the following problems:  ¨ Liver problems  ¨ Serious skin reactions  ¨ Low blood pressure  · If you take this medicine for more than a few weeks, do not stop taking it suddenly. Your doctor will need to slowly decrease your dose before you stop it completely. · Get emergency help immediately if you think you may have taken too much of this medicine. Signs of an overdose include shallow breathing, fainting, confusion, nausea, vomiting, pinpoint pupils of the eyes, or pale or blue lips, fingernails, or skin. · This medicine may make you dizzy or drowsy. Do not drive or do anything that could be dangerous until you know how this medicine affects you. · This medicine contains acetaminophen. Read the labels of all other medicines you are using to see if they also contain acetaminophen, or ask your doctor or pharmacist. [place] not use more than 4 grams (4,000 milligrams) total of acetaminophen in one day. · This medicine can be habit-forming. Do not use more than your prescribed dose. Call your doctor if you think your medicine is not working. · This medicine may cause constipation, especially with long-term use. Ask your doctor if you should use a laxative to prevent and treat constipation. · Keep all medicine out of the reach of children. Never share your medicine with anyone.   Possible Side Effects While Using This Medicine:   Call your doctor right away if you notice any of these side effects:  · Allergic reaction: Itching or hives, swelling in your face or hands, swelling or tingling in your mouth or throat, chest tightness, trouble breathing  · Dark urine or pale stools, nausea, vomiting, loss of appetite, stomach pain, yellow skin or eyes  · Extreme weakness, shallow breathing, uneven heartbeat, seizures, sweating, or cold or clammy skin  · Lightheadedness, dizziness, or fainting  · Trouble breathing  If you notice these less serious side effects, talk with your doctor:   · Constipation  · Headache  · Mild lightheadedness, sleepiness, or drowsiness  · Mild nausea or vomiting  If you notice other side effects that you think are caused by this medicine, tell your doctor. Call your doctor for medical advice about side effects. You may report side effects to FDA at 3-527-FDA-5353  © 2016 [address] is for End User's use only and may not be sold, redistributed or otherwise used for commercial purposes. The above information is an  only. It is not intended as medical advice for individual conditions or treatments. Talk to your doctor, nurse or pharmacist before following any medical regimen to see if it is safe and effective for you.

## 2017-02-24 NOTE — IP AVS SNAPSHOT
[facility] 
[phone] Patient: Danie Alfred MRN: [id number] :1950 You are allergic to the following Allergen Reactions Amlodipine Swelling Other Medication Other (comments) Eye Drop 2002- Caused pain, redness and discomfort Recent Documentation Height  
  
  
  
  
  
 1.715 m Emergency Contacts Name Discharge Info Relation Home Work Mobile Марина Dempsey DISCHARGE CAREGIVER [3] Spouse [3] [phone] About your hospitalization You were admitted on:  2017 You last received care in the:  [facility] You were discharged on:  2017 Unit phone number:  [phone] Why you were hospitalized Your primary diagnosis was:  Not on File Providers Seen During Your Hospitalizations Provider Role Specialty Primary office phone Israel Barrientos MD Attending Provider General Surgery [phone] Your Primary Care Physician (PCP) Primary Care Physician Office Phone Office Fax Nick Lacy [phone] Follow-up Information Follow up With Details Comments Contact Info Christine Gutierrez, [facility] [address] [place], [address] [facility] [address] 
[phone] Your Appointments 2017  8:00 AM EST  
POST OP with Israel Barrientos MD  
[facility] Dr. Priyank Land [facility] ([facility]) [address] [place] [address], [address] [facility] [facility]  
[phone]  COLONOSCOPY with Deonna Espino MD  
[facility] ([facility] OR PRE ASSESSMENT) [address] [facility]  
[phone] Current Discharge Medication List  
  
START taking these medications Dose & Instructions Dispensing Information Comments Morning Noon Evening Bedtime  
 oxyCODONE-acetaminophen 5-325 mg per tablet Commonly known as:  PERCOCET Your next dose is: Today, Tomorrow Other:  _________ Dose:  1-2 Tab Take 1-2 Tabs by mouth every four (4) hours as needed for Pain. Max Daily Amount: 12 Tabs. Quantity:  35 Tab Refills:  0 CONTINUE these medications which have CHANGED Dose & Instructions Dispensing Information Comments Morning Noon Evening Bedtime  
 bumetanide 1 mg tablet Commonly known as:  [place] What changed:  See the new instructions. Your next dose is: Today, Tomorrow Other:  _________ TAKE ONE TABLET BY MOUTH ONCE DAILY FOR BLOOD PRESSURE Quantity:  30 Tab Refills:  5 CONTINUE these medications which have NOT CHANGED Dose & Instructions Dispensing Information Comments Morning Noon Evening Bedtime  
 ascorbic acid (vitamin C) 500 mg tablet Commonly known as:  VITAMIN C Your next dose is: Today, Tomorrow Other:  _________ Dose:  1500 mg Take 1,500 mg by mouth daily. Refills:  0  
     
   
   
   
  
 cholecalciferol 1,000 unit tablet Commonly known as:  VITAMIN D3 Your next dose is: Today, Tomorrow Other:  _________ Dose:  5000 Units Take 5,000 Units by mouth daily. Refills:  0  
     
   
   
   
  
 cloNIDine HCl 0.1 mg tablet Commonly known as:  CATAPRES Your next dose is: Today, Tomorrow Other:  _________ Dose:  0.1 mg Take 0.1 mg by mouth two (2) times a day. Refills:  0 COREG 12.5 mg tablet Generic drug:  carvedilol Your next dose is: Today, Tomorrow Other:  _________ Dose:  25 mg Take 25 mg by mouth two (2) times daily (with meals). Refills:  0  
     
   
   
   
  
 febuxostat 40 mg Tab tablet Commonly known as:  [place]  
   
 Your next dose is: Today, Tomorrow Other:  _________ Dose:  40 mg Take 40 mg by mouth daily. Refills:  0  
     
   
   
   
  
 OTHER Your next dose is: Today, Tomorrow Other:  _________ Indications: Took antacid medication last night but does not remember name. Refills:  0  
     
   
   
   
  
 simvastatin 40 mg tablet Commonly known as:  ZOCOR Your next dose is: Today, Tomorrow Other:  _________ TAKE ONE TABLET BY MOUTH NIGHTLY Quantity:  90 Tab Refills:  0 Where to Get Your Medications Information on where to get these meds will be given to you by the nurse or doctor. ! Ask your nurse or doctor about these medications  
  oxyCODONE-acetaminophen 5-325 mg per tablet Discharge Instructions Discharge Instructions After Insertion of Peritoneal Dialysis Catheter Keep dressing in place until first office visit. Keep surgical site dry for two weeks. No lifting over fifteen pounds for 1 month. See Dr. Marquita Rod in the office this coming Monday morning for dressing change. Call to confirm appointment [phone]. Use pain medication as prescribed for pain. Do not drive for two days. Do not drive while taking pain medication. For any problems call Dr. Marquita Rod    [phone] or [phone] (after office hours). Shruti Felipe MD 
 
A common side effect of anesthesia following surgery is nausea and/or vomiting. In order to decrease symptoms, it is wise to avoid foods that are high in fat, greasy foods, milk products, and spicy foods for the first 24 hours. Acceptable foods for the first 24 hours following surgery include but are not limited to: 
 
? soup 
? broth 
?  toast  
? crackers ? applesauce 
? bananas  
? mashed potatoes, 
? soft or scrambled eggs 
? oatmeal 
?  jello It is important to eat when taking your pain medication. This will help to prevent nausea. If possible, please try to time your meals with your medications. It is very important to stay hydrated following surgery. Sip fluids frequently while awake. Avoid acidic drinks such as citrus juices and soda for 24 hours. Carbonated beverages may cause bloating and gas. Acceptable fluids include: 
 
? water (flavor packets may add variety) ? coffee or tea (in moderation) ? Gatorade ? Alex Diones ? apple juice 
? cranberry juice You are encouraged to cough and deep breathe every hour when awake. This will help to prevent respiratory complications following anesthesia. You may want to hug a pillow when coughing and sneezing to add additional support to the surgical area and to decrease discomfort if you had abdominal or chest surgery. If you are discharged home with support stockings, you may remove them after 24 hours. Support stockings are used to help prevent blood clots in the legs following surgery. Please take time to review all of your Home Care Instructions and Medication Information sheets provided in your discharge packet. If you have any questions, please contact your surgeons office. Thank you. Peritoneal Dialysis Catheter Care: Care Instructions Your Care Instructions Dialysis does the work of your kidneys when you have kidney failure. It filters wastes and removes extra fluid. It also keeps the right balance of chemicals in your blood. Peritoneal dialysis uses the lining of your belly to filter your blood. Before you start dialysis, your doctor creates a dialysis access. This is the place where the dialysis solution can flow into and out of your body. To make the access, the doctor places a soft tube in your belly. This tube is called a catheter. When you do dialysis, the solution flows into your belly and stays there for several hours. Then you remove it through the catheter. It is important to take care of the catheter and the access area to prevent infection. Follow-up care is a key part of your treatment and safety. Be sure to make and go to all appointments, and call your doctor if you are having problems. It's also a good idea to know your test results and keep a list of the medicines you take. How can you care for yourself at home? Care of the catheter and access · After the doctor creates your access, keep the bandage dry and clean. Change a dirty or bloody bandage. · Keep your access area clean and dry. Check it every day for signs of infection. · Always clean and dry your catheter and access area right away after you get wet. · Always wash your hands before you touch the catheter. · Fasten or tape the catheter to your body to keep it from catching on your clothes. · Never use scissors or other sharp objects around your catheter. · Do not use unapproved clamps on your catheter. · Store your dialysis supplies in a cool, dry place. Activity when you have an access · Do not lift heavy objects. · Do not swim or take a bath unless your doctor tells you it is okay. When should you call for help? Call your doctor now or seek immediate medical care if: 
· You have signs of infection, such as: 
¨ Increased pain, swelling, warmth, or redness. ¨ Red streaks leading from the access area. ¨ Pus draining from the access area. ¨ A fever. · You have belly pain. Watch closely for changes in your health, and be sure to contact your doctor if: 
· You have nausea and vomiting. · The dialysis fluid looks cloudy or is a different color. · Fluid does not flow through the catheter. Where can you learn more? Go to http://emerson-adalgisa.info/. Enter R310 in the search box to learn more about \"Peritoneal Dialysis Catheter Care: Care Instructions. \" Current as of: November 20, 2015 Content Version: 11.1 © 3520-4354 Healthwise, Incorporated. Care instructions adapted under license by [employer] (which disclaims liability or warranty for this information). If you have questions about a medical condition or this instruction, always ask your healthcare professional. [address] any warranty or liability for your use of this information. DISCHARGE SUMMARY from Nurse The following personal items are in your possession at time of discharge: 
 
Dental Appliances: None Visual Aid: Glasses, With patient Hearing Aids/Status: Does not own Home Medications: None Jewelry: None Clothing: Undergarments, With patient (home clothing) Other Valuables: David Hwang Personal Items Sent to Safe: Denies need to send valuables with security. PATIENT INSTRUCTIONS: 
 
 
F-face looks uneven A-arms unable to move or move unevenly S-speech slurred or non-existent T-time-call 911 as soon as signs and symptoms begin-DO NOT go Back to bed or wait to see if you get better-TIME IS BRAIN. Warning Signs of HEART ATTACK Call 911 if you have these symptoms: 
? Chest discomfort. Most heart attacks involve discomfort in the center of the chest that lasts more than a few minutes, or that goes away and comes back. It can feel like uncomfortable pressure, squeezing, fullness, or pain. ? Discomfort in other areas of the upper body. Symptoms can include pain or discomfort in one or both arms, the back, neck, jaw, or stomach. ? Shortness of breath with or without chest discomfort. ? Other signs may include breaking out in a cold sweat, nausea, or lightheadedness. Don't wait more than five minutes to call [address]! Fast action can save your life. Calling 911 is almost always the fastest way to get lifesaving treatment.  Emergency Medical Services staff can begin treatment when they arrive  up to an hour sooner than if someone gets to the hospital by car. The discharge information has been reviewed with the patient and spouse. The patient and spouse verbalized understanding. Discharge medications reviewed with the patient and spouse and appropriate educational materials and side effects teaching were provided. Oxycodone/Acetaminophen (By mouth) Acetaminophen (e-zevl-j-MIN-oh-fen), Oxycodone Hydrochloride (he-i-LMD-done tiffanie-droe-KLOR-helen) Treats moderate to moderately severe pain. This medicine is a narcotic pain reliever. Brand Name(s):Endocet, Percocet, Primlev, Roxicet, Xartemis XR There may be other brand names for this medicine. When This Medicine Should Not Be Used: This medicine is not right for everyone. Do not use it if you had an allergic reaction to acetaminophen or oxycodone, or if you have serious breathing problems (such as severe asthma, hypercarbia, respiratory depression) or paralytic ileus. How to Use This Medicine:  
Capsule, Liquid, Tablet, Long Acting Tablet · Your doctor will tell you how much medicine to use. Do not use more than directed. · Measure the oral liquid medicine with a marked measuring spoon, oral syringe, or medicine cup. · Swallow the extended-release tablet whole. Do not crush, break, or chew it. Do not lick or wet the tablet before placing it in your mouth. Do not give this medicine through a feeding tube. · This medicine should come with a Medication Guide. Ask your pharmacist for a copy if you do not have one. · Store the medicine in a closed container at room temperature, away from heat, moisture, and direct light. Ask your pharmacist about the best way to dispose of medicine you do not use. Drugs and Foods to Avoid: Ask your doctor or pharmacist before using any other medicine, including over-the-counter medicines, vitamins, and herbal products. · Do not use Xartemis XR if you have used an MAO inhibitor in the past 14 days. · Some medicines and foods can affect how this medicine works. Tell your doctor if you are taking any of the following: ¨ Butorphanol, lamotrigine, nalbuphine, naltrexone, pentazocine, propranolol, zidovudine ¨ Birth control pills, a diuretic (water pill), muscle relaxants, a phenothiazine medicine (chlorpromazine, perphenazine, promethazine, prochlorperazine, thioridazine) · Do not drink alcohol while you are using this medicine. Acetaminophen can damage your liver, and alcohol can increase this risk. Do not take acetaminophen without asking your doctor if you have 3 or more drinks of alcohol every day. · Tell your doctor if you are using any medicine that makes you sleepy, such as allergy medicine or other narcotic pain medicine. Warnings While Using This Medicine: · Tell your doctor if you are pregnant, or if you have kidney disease, liver disease, heart disease, low blood pressure, breathing problems or lung disease, especially if you have asthma, COPD, cor pulmonale, or kyphoscoliosis (curved spine that causes breathing trouble). Tell your doctor if you have urination problems, an underactive thyroid, Emmons disease, pancreas or prostate problems, or a stomach disorder. Tell your doctor if you have a history of head injury or brain damage, seizures, or alcohol or drug abuse. Tell your doctor if you are allergic to codeine. · Do not breastfeed while you are using this medicine, unless otherwise directed by your doctor. · This medicine may cause the following problems: 
¨ Liver problems ¨ Serious skin reactions ¨ Low blood pressure · If you take this medicine for more than a few weeks, do not stop taking it suddenly. Your doctor will need to slowly decrease your dose before you stop it completely.  
· Get emergency help immediately if you think you may have taken too much of this medicine. Signs of an overdose include shallow breathing, fainting, confusion, nausea, vomiting, pinpoint pupils of the eyes, or pale or blue lips, fingernails, or skin. · This medicine may make you dizzy or drowsy. Do not drive or do anything that could be dangerous until you know how this medicine affects you. · This medicine contains acetaminophen. Read the labels of all other medicines you are using to see if they also contain acetaminophen, or ask your doctor or pharmacist. John Amel not use more than 4 grams (4,000 milligrams) total of acetaminophen in one day. · This medicine can be habit-forming. Do not use more than your prescribed dose. Call your doctor if you think your medicine is not working. · This medicine may cause constipation, especially with long-term use. Ask your doctor if you should use a laxative to prevent and treat constipation. · Keep all medicine out of the reach of children. Never share your medicine with anyone. Possible Side Effects While Using This Medicine:  
Call your doctor right away if you notice any of these side effects: · Allergic reaction: Itching or hives, swelling in your face or hands, swelling or tingling in your mouth or throat, chest tightness, trouble breathing · Dark urine or pale stools, nausea, vomiting, loss of appetite, stomach pain, yellow skin or eyes · Extreme weakness, shallow breathing, uneven heartbeat, seizures, sweating, or cold or clammy skin · Lightheadedness, dizziness, or fainting · Trouble breathing If you notice these less serious side effects, talk with your doctor: · Constipation · Headache · Mild lightheadedness, sleepiness, or drowsiness · Mild nausea or vomiting If you notice other side effects that you think are caused by this medicine, tell your doctor. Call your doctor for medical advice about side effects. You may report side effects to FDA at 4-202-OAL-5317 © 2016 [address] is for End User's use only and may not be sold, redistributed or otherwise used for commercial purposes. The above information is an  only. It is not intended as medical advice for individual conditions or treatments. Talk to your doctor, nurse or pharmacist before following any medical regimen to see if it is safe and effective for you. Discharge Orders None Introducing [facility]! Dear Johnnie Barnes: 
Thank you for requesting a enercast account. Our records indicate that you already have an active enercast account. You can access your account anytime at [email or web address] [email or web address]. [employer]/Amand[employer] Did you know that you can access your hospital and ER discharge instructions at any time in enercast? You can also review all of your test results from your hospital stay or ER visit. Additional Information If you have questions, please visit the Frequently Asked Questions section of the enercast website at [email or web address]/. Remember, enercast is NOT to be used for urgent needs. For medical emergencies, dial 911. Now available from your iPhone and Android! General Information Please provide this summary of care documentation to your next provider. Patient Signature:  ____________________________________________________________ Date:  ____________________________________________________________  
  
Kaylan Valadez Provider Signature:  ____________________________________________________________ Date:  ____________________________________________________________

## 2017-02-24 NOTE — BRIEF OP NOTE
BRIEF OPERATIVE NOTE    Date of Procedure: 2/24/2017   Preoperative Diagnosis: CHRONIC KIDNEY DISEASE STAGE V  Postoperative Diagnosis: CHRONIC KIDNEY DISEASE STAGE V    Procedure(s):   LAPAROSCOPIC INSERTION  PERITONEAL DIALYSIS CATHETER   Surgeon(s) and Role:     * Lennox Hoist, MD - Primary            Surgical Staff:  Circ-1: Rozina Gayle RN  Circ-Relief: Shirin Hernandez RN  Scrub Tech-1: Payton Kelly  Scrub Tech-2: Shahram Mcdonoughub RN-Relief: Shirin Hernandez RN  Surg Asst-1: Bryant Shi  Surg Asst-Relief: Shirin Hernandez RN  Event Time In   Incision Start 1155   Incision Close      Anesthesia: General   Estimated Blood Loss: minimal  Specimens: * No specimens in log *   Findings: No adhesions in abdominal cavity. Catheter coil placed in pelvis. Complications: none  Implants:   Implant Name Type Inv.  Item Serial No.  Lot No. LRB No. Used Action   PERITONEAL DIALYSIS CATHETER  32.5 CM     NA Wanderio [id number] N/A 1 Implanted

## 2017-02-24 NOTE — PERIOP NOTES
TRANSFER - OUT REPORT:    Verbal report given to JANAY Chand RN(name) on [facility]  being transferred to Phase II(unit) for routine progression of care       Report consisted of patients Situation, Background, Assessment and   Recommendations(SBAR). Information from the following report(s) OR Summary, Procedure Summary, Intake/Output and MAR was reviewed with the receiving nurse. Opportunity for questions and clarification was provided.       Patient transported with:   Registered Nurse

## 2017-02-24 NOTE — ANESTHESIA PREPROCEDURE EVALUATION
Anesthetic History               Review of Systems / Medical History      Pulmonary                   Neuro/Psych              Cardiovascular    Hypertension                   GI/Hepatic/Renal     GERD    Renal disease: ESRD  Hiatal hernia     Endo/Other        Arthritis     Other Findings              Physical Exam    Airway  Mallampati: II  TM Distance: 4 - 6 cm  Neck ROM: normal range of motion   Mouth opening: Normal     Cardiovascular  Regular rate and rhythm,  S1 and S2 normal,  no murmur, click, rub, or gallop             Dental  No notable dental hx       Pulmonary  Breath sounds clear to auscultation               Abdominal  GI exam deferred       Other Findings            Anesthetic Plan    ASA: 3  Anesthesia type: general          Induction: Intravenous  Anesthetic plan and risks discussed with: Patient

## 2017-02-24 NOTE — OP NOTES
Operative Report    CPT 50515        Laparoscopic Insertion of Peritoneal Dialysis Catheter        Patient:  Lilibeth Davalos    Nephrologist:  Dr Indy Jaimes    Date of Surgery:  2/24/2017    Preoperative diagnosis: CKD 5    Postoperative Diagnosis: Same    Anesthesia:  General    Surgeon:  Nam Denney     Procedure:  Laparoscopic Insertion of Peritoneal Dialysis Catheter    Operative Summary:   The patient was taken to the operating room and placed on the operating table in the supine position. General anesthesia was induced. The patient's abdomen was prepared and steriley draped. The patient had been noted preoperatively to wear his waistline at a level 1 inch below the lower edge of the umbilicus. An incision was made above and to the left of the umbilicus. This incision was carried down through the subcutaneous tissue to the fascia. The anterior rectus sheath was opened and the rectus muscle was split. The underlying posterior sheath and peritoneum was opened for about 1 cm. A purse string suture of 4-0 prolene was placed and a 5 mm laparoscopic port was placed. The abdomen was inflated with carbon dioxide and a laparoscope was introduced. There were no adhesions noted in the abdominal cavity. Under visualization a second 5mm port was placed on the opposite side of the abdomen and the camera switched to that port. The port at the operative site was removed. Under visualization and using a long straight catheter guide a [place] Curl Cath peritoneal dialysis catheter was passed into the abdominal cavity and positioned in the pelvis. The guide was removed. The abdomen was then deflated and the remaining port removed. Inflow and oputflow through the catheter were assessed and were good. The deeper of the dacron cuffs lay within the fibers of the rectus muscle. The purse string suture was tied just below the deeper cuff.   The anterior rectus sheath was then closed with running 2-0 prolene. A separate stab wound was made to the side and slightly below the skin incision and the catheter was brought out through this site. The more superficial cuff lay within the subcutaneous tissue. The metal adaptor was attached to the catheter and the catheter was flushed with heparinized saline. There was easy inflow and outflow from the catheter. The catheter was flushed with heparinized saline and capped. The subcutaneous tissue at each incision was closed with 3-0 chromic and the skin was closed with intracuticular 4-0 vicryl. Steri strips and dressings were applied. The patient tolerated the procedure well and was taken to the recovery room in stable condition. Specimen - none    Drain - none    Estimated blood loss - minimal    Complications - none      ELENA Winters MD

## 2017-02-27 ENCOUNTER — OFFICE VISIT (OUTPATIENT)
Dept: SURGERY | Age: 67
End: 2017-02-27

## 2017-02-27 VITALS
OXYGEN SATURATION: 98 % | SYSTOLIC BLOOD PRESSURE: 227 MMHG | BODY MASS INDEX: 27.74 KG/M2 | RESPIRATION RATE: 24 BRPM | HEIGHT: 68 IN | HEART RATE: 69 BPM | WEIGHT: 183 LBS | DIASTOLIC BLOOD PRESSURE: 97 MMHG

## 2017-02-27 DIAGNOSIS — N18.5 CKD (CHRONIC KIDNEY DISEASE) STAGE 5, GFR LESS THAN 15 ML/MIN (HCC): Primary | ICD-10-CM

## 2017-02-27 NOTE — LETTER
NOTIFICATION OF RETURN TO WORK  
 
2/27/2017 8:30 AM 
 
Mr. Bernard Blanton [address] [phone] John Natarajan To Whom It May Concern: 
 
Bernard Blanton was under the care of [address] from 2/24/2017 to the present. He will be able to return to work on 3/6/2017 with limitation of lifting or pushing/pulling no more than 15 pounds. He will have no restrictions starting on 3/31/2017. If there are questions or concerns please have the patient contact our office. Sincerely, Guera Morales MD

## 2017-02-27 NOTE — PROGRESS NOTES
The patient is status post laparoscopic insertion of peritoneal dialysis catheter 2/24/17. He has no complaints. On examination the exit site and incision site both look good. Dressing was changed. The patient will see his peritoneal dialysis nurse to have the catheter flushed and to receive instructions on catheter care. He can follow up prn. At the patient's request, I wrote a note stating that he can return to work on light duty one week from today. He will be limited to lifting or pushing or pulling no more than 15 pounds until six weeks following surgery. At that point, he will have no further restrictions. Final Diagnosis:  Status post insertion peritoneal dialysis catheter,post-operative visit.     Roberto/pepe     cc: Ariel Bond MD

## 2017-02-27 NOTE — MR AVS SNAPSHOT
Visit Information Date & Time Provider Department Dept. Phone Encounter #  
 2/27/2017  8:00 AM Cheo Maddox MD [facility] [address] [phone] [id number] Your Appointments 4/11/2017  4:00 PM  
ESTABLISHED PATIENT with Angelique Viramontes MD  
Arthritis and Osteoporosis [address] ([facility]) Appt Note: 3 month f/up  
 [address] [facility] [address]  
[phone]  
  
   
 [address] [id number] Upcoming Health Maintenance Date Due Hepatitis C Screening 1950 FOBT Q 1 YEAR AGE 50-75 10/23/1998 ZOSTER VACCINE AGE 60> 10/23/2008 GLAUCOMA SCREENING Q2Y 10/23/2013 Pneumococcal 65+ High/Highest Risk (1 of 2 - PCV13) 10/23/2013 MEDICARE YEARLY EXAM 10/23/2013 INFLUENZA AGE 9 TO ADULT 8/1/2016 DTaP/Tdap/Td series (2 - Td) 9/11/2023 Allergies as of 2/27/2017  Review Complete On: 2/27/2017 By: Cheo Maddox MD  
  
 Severity Noted Reaction Type Reactions Amlodipine  07/23/2013    Swelling Other Medication  05/24/2013    Other (comments) Eye Drop 2002- Caused pain, redness and discomfort Current Immunizations  Reviewed on 10/10/2016 Name Date Tdap 9/11/2013 Not reviewed this visit You Were Diagnosed With   
  
 Codes Comments CKD (chronic kidney disease) stage 5, GFR less than 15 ml/min ([facility])    -  Primary ICD-10-CM: N18.5 ICD-9-CM: 886. 5 Vitals BP  
  
  
  
  
  
 (!) 227/97 (BP 1 Location: Right arm, BP Patient Position: Sitting) BMI and BSA Data Body Mass Index Body Surface Area  
 28.24 kg/m 2 1.99 m 2 Preferred Pharmacy Pharmacy Name Phone [facility] [address] [place] [address] [phone] Your Updated Medication List  
  
   
This list is accurate as of: 2/27/17  3:50 PM.  Always use your most recent med list.  
  
  
  
  
 ascorbic acid (vitamin C) 500 mg tablet Commonly known as:  VITAMIN C Take 1,500 mg by mouth daily. bumetanide 1 mg tablet Commonly known as:  Sarina Achilles TAKE ONE TABLET BY MOUTH ONCE DAILY FOR BLOOD PRESSURE  
  
 cholecalciferol 1,000 unit tablet Commonly known as:  VITAMIN D3 Take 5,000 Units by mouth daily. cloNIDine HCl 0.1 mg tablet Commonly known as:  CATAPRES Take 0.1 mg by mouth two (2) times a day. COREG 12.5 mg tablet Generic drug:  carvedilol Take 25 mg by mouth two (2) times daily (with meals). febuxostat 40 mg Tab tablet Commonly known as:  Jacinda Liter Take 40 mg by mouth daily. OTHER Indications: Took antacid medication last night but does not remember name. oxyCODONE-acetaminophen 5-325 mg per tablet Commonly known as:  PERCOCET Take 1-2 Tabs by mouth every four (4) hours as needed for Pain. Max Daily Amount: 12 Tabs. simvastatin 40 mg tablet Commonly known as:  ZOCOR  
TAKE ONE TABLET BY MOUTH NIGHTLY Introducing [facility] & HEALTH SERVICES! Dear Daniel El: 
Thank you for requesting a [employer] account. Our records indicate that you already have an active [employer] account. You can access your account anytime at [email or web address] Did you know that you can access your hospital and ER discharge instructions at any time in [employer]? You can also review all of your test results from your hospital stay or ER visit. Additional Information If you have questions, please visit the Frequently Asked Questions section of the [employer] website at [email or web address] Remember, [employer] is NOT to be used for urgent needs. For medical emergencies, dial 911. Now available from your iPhone and Android! Please provide this summary of care documentation to your next provider. Your primary care clinician is listed as Vivien Saenz. If you have any questions after today's visit, please call [phone].

## 2017-03-16 ENCOUNTER — ANESTHESIA EVENT (OUTPATIENT)
Dept: ENDOSCOPY | Age: 67
End: 2017-03-16
Payer: MEDICARE

## 2017-03-16 ENCOUNTER — ANESTHESIA (OUTPATIENT)
Dept: ENDOSCOPY | Age: 67
End: 2017-03-16
Payer: MEDICARE

## 2017-03-16 ENCOUNTER — HOSPITAL ENCOUNTER (OUTPATIENT)
Age: 67
Setting detail: OUTPATIENT SURGERY
Discharge: HOME | End: 2017-03-16
Attending: INTERNAL MEDICINE | Admitting: INTERNAL MEDICINE
Payer: MEDICARE

## 2017-03-16 ENCOUNTER — SURGERY (OUTPATIENT)
Age: 67
End: 2017-03-16

## 2017-03-16 VITALS
RESPIRATION RATE: 13 BRPM | OXYGEN SATURATION: 100 % | HEART RATE: 57 BPM | DIASTOLIC BLOOD PRESSURE: 97 MMHG | TEMPERATURE: 97.5 F | HEIGHT: 68 IN | BODY MASS INDEX: 28.04 KG/M2 | WEIGHT: 185 LBS | SYSTOLIC BLOOD PRESSURE: 180 MMHG

## 2017-03-16 LAB
ANION GAP BLD CALC-SCNC: 19 MMOL/L (ref 5–15)
BUN BLD-MCNC: 69 MG/DL (ref 9–20)
CA-I BLD-MCNC: 1.12 MMOL/L (ref 1.12–1.32)
CHLORIDE BLD-SCNC: 106 MMOL/L (ref 98–107)
CO2 BLD-SCNC: 21 MMOL/L (ref 21–32)
CREAT BLD-MCNC: 7.8 MG/DL (ref 0.6–1.3)
GLUCOSE BLD-MCNC: 87 MG/DL (ref 75–110)
HCT VFR BLD CALC: 36 % (ref 36.6–50.3)
HGB BLD-MCNC: 12.2 GM/DL (ref 12.1–17)
POTASSIUM BLD-SCNC: 5.5 MMOL/L (ref 3.5–5.1)
SERVICE CMNT-IMP: ABNORMAL
SODIUM BLD-SCNC: 139 MMOL/L (ref 136–145)

## 2017-03-16 PROCEDURE — 77030013992 HC SNR POLYP ENDOSC BSC -B: Performed by: INTERNAL MEDICINE

## 2017-03-16 PROCEDURE — 74011250636 HC RX REV CODE- 250/636

## 2017-03-16 PROCEDURE — 74011250636 HC RX REV CODE- 250/636: Performed by: INTERNAL MEDICINE

## 2017-03-16 PROCEDURE — 80047 BASIC METABLC PNL IONIZED CA: CPT

## 2017-03-16 PROCEDURE — 88302 TISSUE EXAM BY PATHOLOGIST: CPT | Performed by: INTERNAL MEDICINE

## 2017-03-16 PROCEDURE — 74011000250 HC RX REV CODE- 250

## 2017-03-16 PROCEDURE — 76040000007: Performed by: INTERNAL MEDICINE

## 2017-03-16 PROCEDURE — 76060000032 HC ANESTHESIA 0.5 TO 1 HR: Performed by: INTERNAL MEDICINE

## 2017-03-16 RX ORDER — DEXTROMETHORPHAN/PSEUDOEPHED 2.5-7.5/.8
1.2 DROPS ORAL
Status: DISCONTINUED | OUTPATIENT
Start: 2017-03-16 | End: 2017-03-16 | Stop reason: HOSPADM

## 2017-03-16 RX ORDER — MIDAZOLAM HYDROCHLORIDE 1 MG/ML
.25-5 INJECTION, SOLUTION INTRAMUSCULAR; INTRAVENOUS
Status: DISCONTINUED | OUTPATIENT
Start: 2017-03-16 | End: 2017-03-16 | Stop reason: HOSPADM

## 2017-03-16 RX ORDER — EPINEPHRINE 0.1 MG/ML
1 INJECTION INTRACARDIAC; INTRAVENOUS
Status: DISCONTINUED | OUTPATIENT
Start: 2017-03-16 | End: 2017-03-16 | Stop reason: HOSPADM

## 2017-03-16 RX ORDER — SODIUM CHLORIDE 9 MG/ML
INJECTION, SOLUTION INTRAVENOUS
Status: DISCONTINUED | OUTPATIENT
Start: 2017-03-16 | End: 2017-03-16 | Stop reason: HOSPADM

## 2017-03-16 RX ORDER — FLUMAZENIL 0.1 MG/ML
0.2 INJECTION INTRAVENOUS
Status: DISCONTINUED | OUTPATIENT
Start: 2017-03-16 | End: 2017-03-16 | Stop reason: HOSPADM

## 2017-03-16 RX ORDER — SODIUM CHLORIDE 0.9 % (FLUSH) 0.9 %
5-10 SYRINGE (ML) INJECTION EVERY 8 HOURS
Status: DISCONTINUED | OUTPATIENT
Start: 2017-03-16 | End: 2017-03-16 | Stop reason: HOSPADM

## 2017-03-16 RX ORDER — LIDOCAINE HYDROCHLORIDE 20 MG/ML
INJECTION, SOLUTION EPIDURAL; INFILTRATION; INTRACAUDAL; PERINEURAL AS NEEDED
Status: DISCONTINUED | OUTPATIENT
Start: 2017-03-16 | End: 2017-03-16 | Stop reason: HOSPADM

## 2017-03-16 RX ORDER — SODIUM CHLORIDE 9 MG/ML
75 INJECTION, SOLUTION INTRAVENOUS CONTINUOUS
Status: DISCONTINUED | OUTPATIENT
Start: 2017-03-16 | End: 2017-03-16 | Stop reason: HOSPADM

## 2017-03-16 RX ORDER — PROPOFOL 10 MG/ML
INJECTION, EMULSION INTRAVENOUS AS NEEDED
Status: DISCONTINUED | OUTPATIENT
Start: 2017-03-16 | End: 2017-03-16 | Stop reason: HOSPADM

## 2017-03-16 RX ORDER — NALOXONE HYDROCHLORIDE 0.4 MG/ML
0.4 INJECTION, SOLUTION INTRAMUSCULAR; INTRAVENOUS; SUBCUTANEOUS
Status: DISCONTINUED | OUTPATIENT
Start: 2017-03-16 | End: 2017-03-16 | Stop reason: HOSPADM

## 2017-03-16 RX ORDER — ATROPINE SULFATE 0.1 MG/ML
0.5 INJECTION INTRAVENOUS
Status: DISCONTINUED | OUTPATIENT
Start: 2017-03-16 | End: 2017-03-16 | Stop reason: HOSPADM

## 2017-03-16 RX ORDER — FENTANYL CITRATE 50 UG/ML
50 INJECTION, SOLUTION INTRAMUSCULAR; INTRAVENOUS
Status: DISCONTINUED | OUTPATIENT
Start: 2017-03-16 | End: 2017-03-16 | Stop reason: HOSPADM

## 2017-03-16 RX ORDER — SODIUM CHLORIDE 0.9 % (FLUSH) 0.9 %
5-10 SYRINGE (ML) INJECTION AS NEEDED
Status: DISCONTINUED | OUTPATIENT
Start: 2017-03-16 | End: 2017-03-16 | Stop reason: HOSPADM

## 2017-03-16 RX ORDER — FENTANYL CITRATE 50 UG/ML
25 INJECTION, SOLUTION INTRAMUSCULAR; INTRAVENOUS
Status: DISCONTINUED | OUTPATIENT
Start: 2017-03-16 | End: 2017-03-16 | Stop reason: HOSPADM

## 2017-03-16 RX ADMIN — PROPOFOL 50 MG: 10 INJECTION, EMULSION INTRAVENOUS at 11:15

## 2017-03-16 RX ADMIN — PROPOFOL 50 MG: 10 INJECTION, EMULSION INTRAVENOUS at 11:12

## 2017-03-16 RX ADMIN — LIDOCAINE HYDROCHLORIDE 40 MG: 20 INJECTION, SOLUTION EPIDURAL; INFILTRATION; INTRACAUDAL; PERINEURAL at 11:04

## 2017-03-16 RX ADMIN — SODIUM CHLORIDE 75 ML/HR: 900 INJECTION, SOLUTION INTRAVENOUS at 10:53

## 2017-03-16 RX ADMIN — SODIUM CHLORIDE: 9 INJECTION, SOLUTION INTRAVENOUS at 11:00

## 2017-03-16 RX ADMIN — PROPOFOL 100 MG: 10 INJECTION, EMULSION INTRAVENOUS at 11:04

## 2017-03-16 RX ADMIN — PROPOFOL 30 MG: 10 INJECTION, EMULSION INTRAVENOUS at 11:22

## 2017-03-16 NOTE — ANESTHESIA POSTPROCEDURE EVALUATION
Post-Anesthesia Evaluation and Assessment    Patient: Miriam Humphreys MRN: [id number]  SSN: [id number]    YOB: 1950  Age: 74 y.o. Sex: male       Cardiovascular Function/Vital Signs  Visit Vitals    BP 97/59    Pulse 69    Resp 20    Ht 5' 7.5\" (1.715 m)    Wt 83.9 kg (185 lb)    SpO2 98%    BMI 28.55 kg/m2       Patient is status post general, total IV anesthesia anesthesia for Procedure(s):  COLONOSCOPY  ENDOSCOPIC POLYPECTOMY. Nausea/Vomiting: None    Postoperative hydration reviewed and adequate. Pain:  Pain Scale 1: Visual (03/16/17 1136)  Pain Intensity 1: 0 (03/16/17 1136)   Managed    Neurological Status: At baseline    Mental Status and Level of Consciousness: Arousable    Pulmonary Status:   O2 Device: Room air (03/16/17 1136)   Adequate oxygenation and airway patent    Complications related to anesthesia: None    Post-anesthesia assessment completed.  No concerns    Signed By: Stewart Heaton MD     March 16, 2017

## 2017-03-16 NOTE — ANESTHESIA PREPROCEDURE EVALUATION
Anesthetic History   No history of anesthetic complications            Review of Systems / Medical History  Patient summary reviewed, nursing notes reviewed and pertinent labs reviewed    Pulmonary  Within defined limits                 Neuro/Psych   Within defined limits           Cardiovascular  Within defined limits  Hypertension              Exercise tolerance: >4 METS     GI/Hepatic/Renal  Within defined limits   GERD    Renal disease  Hiatal hernia     Endo/Other        Arthritis and anemia     Other Findings   Comments: Gout  Monoclonal gammopathy           Physical Exam    Airway  Mallampati: II  TM Distance: 4 - 6 cm  Neck ROM: normal range of motion   Mouth opening: Normal     Cardiovascular  Regular rate and rhythm,  S1 and S2 normal,  no murmur, click, rub, or gallop             Dental  No notable dental hx       Pulmonary  Breath sounds clear to auscultation               Abdominal  GI exam deferred       Other Findings            Anesthetic Plan    ASA: 3  Anesthesia type: general and total IV anesthesia          Induction: Intravenous  Anesthetic plan and risks discussed with: Patient

## 2017-03-16 NOTE — IP AVS SNAPSHOT
[address] [facility] 
[phone] Patient: Emma Butler MRN: [id number] :1950 You are allergic to the following Allergen Reactions Amlodipine Swelling Other Medication Other (comments) Eye Drop 2002- Caused pain, redness and discomfort Recent Documentation Height Weight BMI Smoking Status 1.715 m 83.9 kg 28.55 kg/m2 Former Smoker Emergency Contacts Name Discharge Info Relation Home Work Mobile Марина Dempsey DISCHARGE CAREGIVER [3] Spouse [3] [phone] Марина Mario  Spouse [3] [phone] About your hospitalization You were admitted on:  2017 You last received care in the:  [facility] You were discharged on:  2017 Unit phone number:  [phone] Why you were hospitalized Your primary diagnosis was:  Not on File Providers Seen During Your Hospitalizations Provider Role Specialty Primary office phone Marj Haque MD Attending Provider Gastroenterology [phone] Your Primary Care Physician (PCP) Primary Care Physician Office Phone Office Fax Salomon Marj [phone] Follow-up Information None Your Appointments 2017  4:00 PM EDT  
ESTABLISHED PATIENT with Chapito Rothman MD  
Arthritis and [facility] ([facility]) [address] [facility] [address] [place] [address]  
[phone] Current Discharge Medication List  
  
CONTINUE these medications which have CHANGED Dose & Instructions Dispensing Information Comments Morning Noon Evening Bedtime  
 bumetanide 1 mg tablet Commonly known as:  Guy Mikey What changed:  See the new instructions. Your last dose was: Your next dose is:  TAKE ONE TABLET BY MOUTH ONCE DAILY FOR BLOOD PRESSURE  
 Quantity:  30 Tab Refills:  5 CONTINUE these medications which have NOT CHANGED Dose & Instructions Dispensing Information Comments Morning Noon Evening Bedtime  
 ascorbic acid (vitamin C) 500 mg tablet Commonly known as:  VITAMIN C Your last dose was: Your next dose is:    
   
   
 Dose:  1500 mg Take 1,500 mg by mouth daily. Refills:  0  
     
   
   
   
  
 cholecalciferol 1,000 unit tablet Commonly known as:  VITAMIN D3 Your last dose was: Your next dose is:    
   
   
 Dose:  5000 Units Take 5,000 Units by mouth daily. Refills:  0  
     
   
   
   
  
 cloNIDine HCl 0.1 mg tablet Commonly known as:  CATAPRES Your last dose was: Your next dose is:    
   
   
 Dose:  0.2 mg Take 0.2 mg by mouth two (2) times a day. Refills:  0 COREG 12.5 mg tablet Generic drug:  carvedilol Your last dose was: Your next dose is:    
   
   
 Dose:  25 mg Take 25 mg by mouth two (2) times daily (with meals). Refills:  0  
     
   
   
   
  
 febuxostat 40 mg Tab tablet Commonly known as:  Tereso Brown Your last dose was: Your next dose is:    
   
   
 Dose:  40 mg Take 40 mg by mouth daily. Refills:  0  
     
   
   
   
  
 OTHER Your last dose was: Your next dose is:    
   
   
 Indications: Took antacid medication last night but does not remember name. Refills:  0  
     
   
   
   
  
 oxyCODONE-acetaminophen 5-325 mg per tablet Commonly known as:  PERCOCET Your last dose was: Your next dose is:    
   
   
 Dose:  1-2 Tab Take 1-2 Tabs by mouth every four (4) hours as needed for Pain. Max Daily Amount: 12 Tabs. Quantity:  35 Tab Refills:  0  
     
   
   
   
  
 simvastatin 40 mg tablet Commonly known as:  ZOCOR Your last dose was: Your next dose is: TAKE ONE TABLET BY MOUTH NIGHTLY Quantity:  90 Tab Refills:  0  
     
   
   
   
  
 SODIUM BICARBONATE PO Your last dose was: Your next dose is: Take  by mouth three (3) times daily (with meals). Refills:  0 Discharge Instructions Catracho Edson [id number] 
1950 COLON DISCHARGE INSTRUCTIONS Discomfort: 
Redness at IV site- apply warm compress to area; if redness or soreness persist- contact your physician There may be a slight amount of blood passed from the rectum Gaseous discomfort- walking, belching will help relieve any discomfort You may not operate a vehicle for 12 hours You may not engage in an occupation involving machinery or appliances for rest of today You may not drink alcoholic beverages for at least 12 hours Avoid making any critical decisions for at least 24 hour DIET: 
 High fiber diet.  however -  remember your colon is empty and a heavy meal will produce gas. Avoid these foods:  vegetables, fried / greasy foods, carbonated drinks for today MEDICATION: 
 
 
  
ACTIVITY: 
You may not resume your normal daily activities until tomorrow AM; it is recommended that you spend the remainder of the day resting -  avoid any strenuous activity. CALL M.D. ANY SIGN OF: Increasing pain, nausea, vomiting Abdominal distension (swelling) New increased bleeding (oral or rectal) Fever (chills) Pain in chest area Bloody discharge from nose or mouth Shortness of breath You may not  take any Advil, Aspirin, Ibuprofen, Motrin, Aleve, or Goodys for 10 days, ONLY  Tylenol as needed for pain. IMPRESSION: 
-Normal terminal ileum 
-Single sessile 3mm polyp in transverse colon; removed with hot snare, but not retrieved 
-Single sessile 4mm polyp in sigmoid colon at 25cm; removed with hot snare 
-Large 15mm semi-pedunculated rectal polyp at 15cm; removed with hot snare 
-Single sessile rectal polyp at 6cm; removed with hot snare -Scattered diverticulosis seen throughout colon 
-Internal hemorrhoids with external tag Follow-up Instructions: 
 Call Dr. Codie Espinoza for the results of procedure / biopsy in 7-10 days Telephone # [phone] Repeat colonoscopy in 3 years Mortimer Peaches, MD 
 
[employer] Activation Thank you for requesting access to [employer]. Please follow the instructions below to securely access and download your online medical record. [employer] allows you to send messages to your doctor, view your test results, renew your prescriptions, schedule appointments, and more. How Do I Sign Up? 1. In your internet browser, go to [email or web address] 
2. Click on the First Time User? Click Here link in the Sign In box. You will be redirect to the New Member Sign Up page. 3. Enter your [employer] Access Code exactly as it appears below. You will not need to use this code after youve completed the sign-up process. If you do not sign up before the expiration date, you must request a new code. [employer] Access Code: Activation code not generated Current Merry[employer]ry Status: Active (This is the date your Mer[employer] access code will ) 4. Enter the last four digits of your Social Security Number (xxxx) and Date of Birth (mm/dd/yyyy) as indicated and click Submit. You will be taken to the next sign-up page. 5. Create a MerryMarry ID. This will be your [employer]ry login ID and cannot be changed, so think of one that is secure and easy to remember. 6. Create a [employer] password. You can change your password at any time. 7. Enter your Password Reset Question and Answer. This can be used at a later time if you forget your password. 8. Enter your e-mail address. You will receive e-mail notification when new information is available in [address] 9. Click Sign Up. You can now view and download portions of your medical record. 10. Click the Download Summary menu link to download a portable copy of your medical information. Additional Information If you have questions, please visit the Frequently Asked Questions section of the [employer] website at [email or web address] Remember, [employer] is NOT to be used for urgent needs. For medical emergencies, dial 911. Discharge Orders None ACO Transitions of Care Introducing [facility] [employer] offers a voluntary care coordination program to provide high quality service and care to [facility] fee-for-service beneficiaries. Isael Mcdermott was designed to help you enhance your health and well-being through the following services: ? Transitions of Care  support for individuals who are transitioning from one care setting to another (example: Hospital to home). ? Chronic and Complex Care Coordination  support for individuals and caregivers of those with serious or chronic illnesses or with more than one chronic (ongoing) condition and those who take a number of different medications. If you meet specific medical criteria, a [address] [place] [address] Rd may call you directly to coordinate your care with your primary care physician and your other care providers. For questions about the [facility] programs, please, contact your physicians office. For general questions or additional information about Accountable Care Organizations: 
Please visit www.medicare.gov/acos. html or call 1-800-MEDICARE (1-531.292.7527) TTY users should call 8-223.162.8846. Introducing [facility] & HEALTH SERVICES! Dear Jayshree Stone: 
Thank you for requesting a [employer] account. Our records indicate that you already have an active [employer] account. You can access your account anytime at [email or web address] Did you know that you can access your hospital and ER discharge instructions at any time in [employer]? You can also review all of your test results from your hospital stay or ER visit. Additional Information If you have questions, please visit the Frequently Asked Questions section of the MyChart website at [email or web address] Remember, MyChart is NOT to be used for urgent needs. For medical emergencies, dial 911. Now available from your iPhone and Android! General Information Please provide this summary of care documentation to your next provider. Patient Signature:  ____________________________________________________________ Date:  ____________________________________________________________  
  
Uzair Otto Provider Signature:  ____________________________________________________________ Date:  ____________________________________________________________

## 2017-03-16 NOTE — PROCEDURES
NAME:  Lam Patel   :   1950   MRN:   [id number]     Date/Time:  3/16/2017 11:41 AM    Colonoscopy Operative Report    Procedure Type:   Colonoscopy with polypectomy (snare cautery)     Indications:     Screening colonoscopy  Pre-operative Diagnosis: see indication above  Post-operative Diagnosis:  See findings below  :  Suhail Smith MD  Referring Provider: Nilda Mckeon MD;-Sharon Jamison MD    Exam:  Airway: clear, no airway problems anticipated  Heart: RRR, without gallops or rubs  Lungs: clear bilaterally without wheezes, crackles, or rhonchi  Abdomen: soft, nontender, nondistended, bowel sounds present  Mental Status: awake, alert and oriented to person, place and time    Sedation:  MAC anesthesia Propofol 230mg IV  Procedure Details:  After informed consent was obtained with all risks and benefits of procedure explained and preoperative exam completed, the patient was taken to the endoscopy suite and placed in the left lateral decubitus position. Upon sequential sedation as per above, a digital rectal exam was performed demonstrating internal hemorrhoids. The Olympus videocolonoscope  was inserted in the rectum and carefully advanced to the cecum, which was identified by the ileocecal valve and appendiceal orifice. The distal terminal ileum was evaluated. The quality of preparation was adequate. The colonoscope was slowly withdrawn with careful evaluation between folds. Retroflexion in the rectum was completed demonstrating internal hemorrhoids.      Findings:     -Normal terminal ileum  -Single sessile 3mm polyp in transverse colon; removed with hot snare, but not retrieved  -Single sessile 4mm polyp in sigmoid colon at 25cm; removed with hot snare  -Large 15mm semi-pedunculated rectal polyp at 15cm; removed with hot snare  -Single sessile rectal polyp at 6cm; removed with hot snare  -Scattered diverticulosis seen throughout colon  -Internal hemorrhoids with external tag    Specimen Removed:  #1 sigmoid polyp; #2 rectal polyps  Complications: None. EBL:  None. Impression:    -Normal terminal ileum  -Single sessile 3mm polyp in transverse colon; removed with hot snare, but not retrieved  -Single sessile 4mm polyp in sigmoid colon at 25cm; removed with hot snare  -Large 15mm semi-pedunculated rectal polyp at 15cm; removed with hot snare  -Single sessile rectal polyp at 6cm; removed with hot snare  -Scattered diverticulosis seen throughout colon  -Internal hemorrhoids with external tag    Recommendations: --Await pathology. , -Repeat colonoscopy in 3 years. High fiber diet. Resume normal medication(s). NO aspirin for 10 days. You will receive a letter about the biopsy results in about 10 days. You may be asked to call your doctor's office for the results. Discharge Disposition:  Home in the company of a  when able to ambulate.     Poonam Barney MD

## 2017-03-16 NOTE — PERIOP NOTES
Anesthesia reports 230mg Propofol, 30mg Lidocaine and 200mL NS given during procedure. Received report from anesthesia staff on vital signs and status of patient.

## 2017-03-16 NOTE — H&P
Gastroenterology Outpatient History and Physical    Patient: [facility]    Physician: Nanette Apodaca MD    Chief Complaint:  fam hx colon polyps  History of Present Illness: 65yo M with  fam hx colon polyps. Being considered for renal transplant. LAst colonoscopy >10yrs ago.     History:  Past Medical History:   Diagnosis Date    Anemia     Arthritis     hands    Benign hypertensive renal disease 3/18/16    Dr. Sushant Turner Chronic kidney disease, stage III (moderate) 3/18/16    Dr. Suad Aguilar no dialysis as of 2/22/2017    Cold sore     right side of mouth    GERD (gastroesophageal reflux disease)     hiatal hernia    Gout 2013    Gout     Hiatal hernia     Hyperlipidemia     Hypertension     MGUS (monoclonal gammopathy of unknown significance) 3/22/2016    Persistent proteinuria     with m-spike, not seen 5/2013    Prediabetes     Proteinuria 3/18/16    Dr. Canchola Stable Right knee pain 4/13/16    Dr. Titi Tong    Vitamin D deficiency       Past Surgical History:   Procedure Laterality Date    ABDOMEN SURGERY PROC UNLISTED  1969    Hernia repair    ABDOMEN SURGERY PROC UNLISTED  02/24/2017    Laparoscopic insertion of peritoneal dialysis catheter    ENDOSCOPY, COLON, DIAGNOSTIC  \"94, '05    Normal - repeat in 2014-15    HX HEENT      tonsillectomy (4 yrs old)   [address] [place] [address] HX HERNIA REPAIR  1969    Right inguinal hernia repair      Social History     Social History    Marital status:      Spouse name: N/A    Number of children: 2    Years of education: N/A     Occupational History     [address] History Main Topics    Smoking status: Former Smoker     Years: 3.00     Types: Cigarettes     Quit date: 1/1/1987    Smokeless tobacco: Never Used    Alcohol use No    Drug use: No    Sexual activity: Not Currently     Partners: Female     Other Topics Concern    None     Social History Narrative      Family History   Problem Relation Age of Onset  Dementia Mother 80    Stroke Father 64     ?etiology    Hypertension Father       Patient Active Problem List   Diagnosis Code    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 Authorizing Provider   SODIUM BICARBONATE PO Take  by mouth three (3) times daily (with meals). Yes Historical Provider   cloNIDine HCl (CATAPRES) 0.1 mg tablet Take 0.2 mg by mouth two (2) times a day. Yes Historical Provider   simvastatin (ZOCOR) 40 mg tablet TAKE ONE TABLET BY MOUTH NIGHTLY 2/9/17  Yes Shirin Drake MD   carvedilol (COREG) 12.5 mg tablet Take 25 mg by mouth two (2) times daily (with meals). Yes Historical Provider   bumetanide (BUMEX) 1 mg tablet TAKE ONE TABLET BY MOUTH ONCE DAILY FOR BLOOD PRESSURE  Patient taking differently: patient takes 3 times  a week 5/20/16  Yes Bakari Martin MD   febuxostat (ULORIC) 40 mg tab tablet Take 40 mg by mouth daily. Yes Historical Provider   cholecalciferol (VITAMIN D3) 1,000 unit tablet Take 5,000 Units by mouth daily. Yes Historical Provider   ascorbic acid (VITAMIN C) 500 mg tablet Take 1,500 mg by mouth daily. Yes Historical Provider   OTHER Indications: Took antacid medication last night but does not remember name. Historical Provider   oxyCODONE-acetaminophen (PERCOCET) 5-325 mg per tablet Take 1-2 Tabs by mouth every four (4) hours as needed for Pain. Max Daily Amount: 12 Tabs.  2/24/17 Linda Lowe MD     Physical Exam:   Vital Signs: Blood pressure (!) 177/93, pulse (!) 56, resp. rate 14, height 5' 7.5\" (1.715 m), weight 83.9 kg (185 lb), SpO2 99 %.   General: well developed, well nourished   HEENT: unremarkable   Heart: regular rhythm no mumur    Lungs: clear   Abdominal:  benign   Neurological: unremarkable   Extremities: no edema     Findings/Diagnosis: fam hx colon polyps  Plan of Care/Planned Procedure: Colonoscopy with conscious/deep sedation    Signed:  Fabiano Ocasio MD 3/16/2017

## 2017-03-16 NOTE — ROUTINE PROCESS
Kimberly Lind  1950  [id number]    Situation:  Verbal report received from: Marcial Neville Rn  Procedure: Procedure(s):  COLONOSCOPY  ENDOSCOPIC POLYPECTOMY    Background:    Preoperative diagnosis: HX COLONIC POLYPS  Postoperative diagnosis: Polyps, diverticulosis    :  Dr. Norma Dias  Assistant(s): Endoscopy Technician-1: Henry Vera  Endoscopy RN-1: David Boyer RN    Specimens:   ID Type Source Tests Collected by Time Destination   1 : Polyp Preservative Sigmoid  Kami Jackson MD 3/16/2017 1113 Pathology   2 : Polyp Preservative Rectum  Kami Jackson MD 3/16/2017 1126 Pathology     H. Pylori  no    Assessment:  Intra-procedure medications       Anesthesia gave intra-procedure sedation and medications, see anesthesia flow sheet     Intravenous fluids: NS@ KVO     Vital signs stable     Abdominal assessment: round and soft     Recommendation:  Discharge patient per MD order.   Family or Friend   Permission to share finding with family or friend yes

## 2017-03-16 NOTE — DISCHARGE INSTRUCTIONS
Nalinianegl Lerma  [id number]  1950    COLON DISCHARGE INSTRUCTIONS  Discomfort:  Redness at IV site- apply warm compress to area; if redness or soreness persist- contact your physician  There may be a slight amount of blood passed from the rectum  Gaseous discomfort- walking, belching will help relieve any discomfort  You may not operate a vehicle for 12 hours  You may not engage in an occupation involving machinery or appliances for rest of today  You may not drink alcoholic beverages for at least 12 hours  Avoid making any critical decisions for at least 24 hour  DIET:   High fiber diet. - however -  remember your colon is empty and a heavy meal will produce gas. Avoid these foods:  vegetables, fried / greasy foods, carbonated drinks for today  MEDICATION:         ACTIVITY:  You may not resume your normal daily activities until tomorrow AM; it is recommended that you spend the remainder of the day resting -  avoid any strenuous activity. CALL M.D. ANY SIGN OF:   Increasing pain, nausea, vomiting  Abdominal distension (swelling)  New increased bleeding (oral or rectal)  Fever (chills)  Pain in chest area  Bloody discharge from nose or mouth  Shortness of breath    You may not  take any Advil, Aspirin, Ibuprofen, Motrin, Aleve, or Goodys for 10 days, ONLY  Tylenol as needed for pain.     IMPRESSION:  -Normal terminal ileum  -Single sessile 3mm polyp in transverse colon; removed with hot snare, but not retrieved  -Single sessile 4mm polyp in sigmoid colon at 25cm; removed with hot snare  -Large 15mm semi-pedunculated rectal polyp at 15cm; removed with hot snare  -Single sessile rectal polyp at 6cm; removed with hot snare  -Scattered diverticulosis seen throughout colon  -Internal hemorrhoids with external tag    Follow-up Instructions:   Call Dr. Jorge A Phillips for the results of procedure / biopsy in 7-10 days  Telephone # [phone]  Repeat colonoscopy in 3 years    Royal Guillermo MD    [address] [place] [id number]    Thank you for requesting access to [facility]. Please follow the instructions below to securely access and download your online medical record. [facility] allows you to send messages to your doctor, view your test results, renew your prescriptions, schedule appointments, and more. How Do I Sign Up? 1. In your internet browser, go to [email or web address]  2. Click on the First Time User? Click Here link in the Sign In box. You will be redirect to the New Member Sign Up page. 3. Enter your [facility] Access Code exactly as it appears below. You will not need to use this code after youve completed the sign-up process. If you do not sign up before the expiration date, you must request a new code. [facility] Access Code: Activation code not generated  Current [facility] Status: Active (This is the date your [facility] access code will )    4. Enter the last four digits of your Social Security Number (xxxx) and Date of Birth (mm/dd/yyyy) as indicated and click Submit. You will be taken to the next sign-up page. 5. Create a [facility] ID. This will be your [facility] login ID and cannot be changed, so think of one that is secure and easy to remember. 6. Create a [facility] password. You can change your password at any time. 7. Enter your Password Reset Question and Answer. This can be used at a later time if you forget your password. 8. Enter your e-mail address. You will receive e-mail notification when new information is available in [address] 9. Click Sign Up. You can now view and download portions of your medical record. 10. Click the Download Summary menu link to download a portable copy of your medical information. Additional Information    If you have questions, please visit the Frequently Asked Questions section of the [employer] [facility] [employer] website at [email or web address] Remember, [facility] [facility] is NOT to be used for urgent needs. For medical emergencies, dial 911.

## 2017-05-27 ENCOUNTER — HOSPITAL ENCOUNTER (EMERGENCY)
Age: 67
Discharge: HOME | End: 2017-05-27
Attending: FAMILY MEDICINE

## 2017-05-27 VITALS
HEIGHT: 68 IN | WEIGHT: 187 LBS | TEMPERATURE: 97.6 F | SYSTOLIC BLOOD PRESSURE: 148 MMHG | DIASTOLIC BLOOD PRESSURE: 73 MMHG | OXYGEN SATURATION: 97 % | BODY MASS INDEX: 28.34 KG/M2 | HEART RATE: 74 BPM | RESPIRATION RATE: 18 BRPM

## 2017-05-27 DIAGNOSIS — K02.9 DENTAL CARIES: ICD-10-CM

## 2017-05-27 DIAGNOSIS — K08.89 PAIN, DENTAL: Primary | ICD-10-CM

## 2017-05-27 RX ORDER — AMOXICILLIN 500 MG/1
500 TABLET, FILM COATED ORAL 3 TIMES DAILY
Qty: 30 TAB | Refills: 0 | Status: SHIPPED | OUTPATIENT
Start: 2017-05-27 | End: 2017-06-06

## 2017-05-27 RX ORDER — CALCITRIOL 0.5 UG/1
0.5 CAPSULE ORAL DAILY
COMMUNITY
End: 2018-08-16 | Stop reason: ALTCHOICE

## 2017-05-27 NOTE — UC PROVIDER NOTE
HPI Comments: 73 yo male presents today with dental pain/right jaw pain for 2 days. States pain is worse with drinking cold liquids and eating. No facial swelling. Last dental visit was 2 years ago. Recently placed on PD for ESRD. Patient is a 74 y.o. male presenting with jaw pain. The history is provided by the patient. No  was used. Jaw Pain    The incident occurred 2 days ago. He came to the ER via walk-in. The quality of the pain is described as sharp (Worse with eating, cold air, and drinking cold fluids. ). The pain is at a severity of 2/10. The pain is mild. The pain has been intermittent since the injury. He has tried NSAID for the symptoms. The treatment provided mild relief.         Past Medical History:   Diagnosis Date    Anemia     Arthritis     hands    Benign hypertensive renal disease 3/18/16    Dr. Duncan Richards Chronic kidney disease, stage III (moderate) 3/18/16    Dr. Ijeoma Richard no dialysis as of 2/22/2017    Cold sore     right side of mouth    GERD (gastroesophageal reflux disease)     hiatal hernia    Gout 2013    Gout     Hiatal hernia     Hyperlipidemia     Hypertension     MGUS (monoclonal gammopathy of unknown significance) 3/22/2016    Persistent proteinuria     with m-spike, not seen 5/2013    Prediabetes     Proteinuria 3/18/16    Dr. Duncan Richards Renal failure     Right knee pain 4/13/16    Dr. Sara Salgado    Vitamin D deficiency         Past Surgical History:   Procedure Laterality Date    ABDOMEN SURGERY PROC UNLISTED  1969    Hernia repair    ABDOMEN SURGERY [address] UNLISTED  02/24/2017    Laparoscopic insertion of peritoneal dialysis catheter    COLONOSCOPY N/A 3/16/2017    COLONOSCOPY performed by Larose Bence, MD at [facility]  3/16/2017         ENDOSCOPY, COLON, DIAGNOSTIC  \"94, '05    Normal - repeat in 2014-15    HX HEENT      tonsillectomy (4 yrs old)   [address] [facility]    Right inguinal hernia repair         Family History   Problem Relation Age of Onset    Dementia Mother 80    Stroke Father 64     ?etiology    Hypertension Father         Social History     Social History    Marital status:      Spouse name: N/A    Number of children: 2    Years of education: N/A     Occupational History     [address] History Main Topics    Smoking status: Former Smoker     Years: 3.00     Types: Cigarettes     Quit date: 1/1/1987    Smokeless tobacco: Never Used    Alcohol use No    Drug use: No    Sexual activity: Not Currently     Partners: Female     Other Topics Concern    Not on file     Social History Narrative                ALLERGIES: Amlodipine and Other medication    Review of Systems   Constitutional: Negative. HENT: Positive for dental problem. Eyes: Negative. Respiratory: Negative. Cardiovascular: Negative. Gastrointestinal: Negative. Endocrine: Negative. Musculoskeletal: Negative. Skin: Negative. Neurological: Negative. Hematological: Negative. Vitals:    05/27/17 1454   BP: 148/73   Pulse: 74   Resp: 18   Temp: 97.6 °F (36.4 °C)   SpO2: 97%   Weight: 84.8 kg (187 lb)   Height: 5' 8\" (1.727 m)       Physical Exam   Constitutional: He is oriented to person, place, and time. He appears well-developed and well-nourished. HENT:   Head: Normocephalic and atraumatic. Right Ear: External ear normal.   Left Ear: External ear normal.   Nose: Nose normal.   Mouth/Throat: Oropharynx is clear and moist.   Refers to tooth # 1 as source of pain. No severe decay. No facial swelling. Eyes: Conjunctivae and EOM are normal. Pupils are equal, round, and reactive to light. Neck: Normal range of motion. Neck supple. Cardiovascular: Normal rate, regular rhythm and normal heart sounds. Pulmonary/Chest: Effort normal and breath sounds normal.   Musculoskeletal: Normal range of motion.    Neurological: He is alert and oriented to person, place, and time. Skin: Skin is warm and dry. Psychiatric: He has a normal mood and affect. His behavior is normal. Judgment and thought content normal.   Nursing note and vitals reviewed. MDM     Differential Diagnosis; Clinical Impression; Plan:     CLINICAL IMPRESSION:  Pain, dental  (primary encounter diagnosis)  Dental caries    Plan:  1. Odontalgia. Please see a dentist ASAP. 2. Take Amoxicillin as directed. 3.   Risk of Significant Complications, Morbidity, and/or Mortality:   Presenting problems: Moderate  Diagnostic procedures:   Moderate  Progress:   Patient progress:  Stable      Procedures

## 2017-05-27 NOTE — DISCHARGE INSTRUCTIONS
Tooth Decay: Care Instructions  Your Care Instructions    Tooth decay is damage to a tooth caused by plaque. Plaque is a thin film of bacteria that sticks to the teeth above and below the gum line. If plaque isn't removed from the teeth, it can build up and harden into tartar. The bacteria in plaque and tartar use sugars in food to make acids. These acids can cause tooth decay and gum disease. Any part of your tooth can decay, from the roots below the gum line to the chewing surface. Decay can affect the outer layer (enamel) or inner layer (dentin) of your teeth. The deeper the decay, the worse the damage. Untreated tooth decay will get worse and may lead to tooth loss. If you have a small hole (cavity) in your tooth, your dentist can repair it by removing the decay and filling the hole. If you have deeper decay, you may need more treatment. A very badly damaged tooth may have to be removed. Follow-up care is a key part of your treatment and safety. Be sure to make and go to all appointments, and call your dentist if you are having problems. It's also a good idea to know your test results and keep a list of the medicines you take. How can you care for yourself at home? If you have pain:  · Take an over-the-counter pain medicine, such as acetaminophen (Tylenol), ibuprofen (Advil, Motrin), or naproxen (Aleve). Be safe with medicines. Read and follow all instructions on the label. ¨ Do not take two or more pain medicines at the same time unless the doctor told you to. Many pain medicines have acetaminophen, which is Tylenol. Too much acetaminophen (Tylenol) can be harmful. · Put ice or a cold pack on your cheek over the tooth for 10 to 15 minutes at a time. Put a thin cloth between the ice and your skin. To prevent tooth decay  · Brush teeth twice a day, and floss once a day. Brushing with fluoride toothpaste and flossing may be enough to reverse early decay.   · Use a toothbrush with soft, rounded-end bristles and a head that is small enough to reach all parts of your teeth and mouth. Replace your toothbrush every 3 or 4 months. You may also use an electric toothbrush that has rotating and oscillating (back-and-forth) action. · Ask your dentist about having fluoride treatments at the dental office. · Brush your tongue to help get rid of bacteria. · Eat healthy foods that include whole grains, vegetables, and fruits. · Have your teeth cleaned by a professional at least two times a year. · Do not smoke or use smokeless tobacco. Tobacco can make tooth decay worse. When should you call for help? Call your dentist now or seek immediate medical care if:  · You have signs of infection, such as:  ¨ Increased pain, swelling, warmth, or redness. ¨ Red streaks on the gum leading from a tooth. ¨ Pus draining from the gum around a tooth. ¨ A fever. · You have a toothache. Watch closely for changes in your health, and be sure to contact your dentist if you have any problems. Where can you learn more? Go to http://emerson-adalgisa.info/. Enter O262 in the search box to learn more about \"Tooth Decay: Care Instructions. \"  Current as of: August 9, 2016  Content Version: 11.2  © 2453-4777 [employer]. Care instructions adapted under license by [employer] (which disclaims liability or warranty for this information). If you have questions about a medical condition or this instruction, always ask your healthcare professional. [place] [address] any warranty or liability for your use of this information.

## 2017-07-14 RX ORDER — SIMVASTATIN 40 MG/1
TABLET, FILM COATED ORAL
Qty: 90 TAB | Refills: 3 | Status: SHIPPED | OUTPATIENT
Start: 2017-07-14 | End: 2018-08-02 | Stop reason: SDUPTHER

## 2017-09-07 ENCOUNTER — OFFICE VISIT (OUTPATIENT)
Dept: FAMILY MEDICINE CLINIC | Age: 67
End: 2017-09-07

## 2017-09-07 VITALS
BODY MASS INDEX: 28.95 KG/M2 | RESPIRATION RATE: 18 BRPM | SYSTOLIC BLOOD PRESSURE: 168 MMHG | TEMPERATURE: 97.5 F | WEIGHT: 191 LBS | HEIGHT: 68 IN | OXYGEN SATURATION: 98 % | DIASTOLIC BLOOD PRESSURE: 98 MMHG | HEART RATE: 65 BPM

## 2017-09-07 DIAGNOSIS — Z13.31 SCREENING FOR DEPRESSION: ICD-10-CM

## 2017-09-07 DIAGNOSIS — D63.1 ANEMIA IN CHRONIC KIDNEY DISEASE (CKD): ICD-10-CM

## 2017-09-07 DIAGNOSIS — Z13.39 SCREENING FOR ALCOHOLISM: ICD-10-CM

## 2017-09-07 DIAGNOSIS — N18.9 ANEMIA IN CHRONIC KIDNEY DISEASE (CKD): ICD-10-CM

## 2017-09-07 DIAGNOSIS — I10 ESSENTIAL HYPERTENSION: ICD-10-CM

## 2017-09-07 DIAGNOSIS — E55.9 VITAMIN D DEFICIENCY: ICD-10-CM

## 2017-09-07 DIAGNOSIS — R73.9 HYPERGLYCEMIA: ICD-10-CM

## 2017-09-07 DIAGNOSIS — N18.5 CKD (CHRONIC KIDNEY DISEASE) STAGE 5, GFR LESS THAN 15 ML/MIN (HCC): ICD-10-CM

## 2017-09-07 DIAGNOSIS — E78.5 HYPERLIPIDEMIA, UNSPECIFIED HYPERLIPIDEMIA TYPE: ICD-10-CM

## 2017-09-07 DIAGNOSIS — Z00.00 INITIAL MEDICARE ANNUAL WELLNESS VISIT: Primary | ICD-10-CM

## 2017-09-07 DIAGNOSIS — R73.03 PREDIABETES: ICD-10-CM

## 2017-09-07 DIAGNOSIS — Z00.00 LABORATORY EXAM ORDERED AS PART OF ROUTINE GENERAL MEDICAL EXAMINATION: ICD-10-CM

## 2017-09-07 RX ORDER — CARVEDILOL 25 MG/1
25 TABLET ORAL 2 TIMES DAILY WITH MEALS
COMMUNITY

## 2017-09-07 RX ORDER — CALCIUM ACETATE 667 MG/1
CAPSULE ORAL
COMMUNITY
End: 2019-05-02

## 2017-09-07 NOTE — PROGRESS NOTES
.. [address] [place] [address] Visit   09/07/2017  Patient ID: Jayson Martinez is a 74 y.o. male. Assessment/Plan:    Diagnoses and all orders for this visit:    1. Initial Medicare annual wellness visit    2. Screening for alcoholism  -     Annual  Alcohol Screen 15 min ()    3. Screening for depression  -     Depression Screen Annual    4. Essential hypertension    5. Hyperlipidemia, unspecified hyperlipidemia type    6. Prediabetes    7. Vitamin D deficiency  -     VITAMIN D, 25 HYDROXY    8. Anemia in chronic kidney disease (CKD)    9. CKD (chronic kidney disease) stage 5, GFR less than 15 ml/min (HCC)    10. Laboratory exam ordered as part of routine general medical examination  -     METABOLIC PANEL, COMPREHENSIVE  -     CBC W/O DIFF  -     HEMOGLOBIN A1C WITH EAG  -     LIPID PANEL  -     VITAMIN D, 25 HYDROXY    11. Hyperglycemia  -     HEMOGLOBIN A1C WITH EAG    Labs ordered for next visit today. Repeat BP today. CTM. Mostly at goal at home. Declines vaccines     Counselled pt on Patient health concerns and plans. Patient was offered a choice/choices in the treatment plan today. Reviewed return precautions as appropriate. Patient expresses understanding of the plan and agrees with recommendations. See patient instructions for more. More than 50% of this >30 minute encounter was spent in counseling and coordination of care today. Patient Instructions     . Yessenia Peaks TODAY, please go to:   CHECK OUT    Repeat BP   Vision test   Mini cog   Release of records for eye doctor    Please schedule the following appointments at [address] [place] [address]:  · Cholesterol and blood sugar follow up with Dr. Sweta Felipe in Nov 2017  · Lab visit, fasting the week before our visit. · Honoring Choices Appointment with Nurse Navigator in Oct 2017      Today's Plan:  - Flu season is coming! Remember to get your flu vaccine!    Medicare Wellness Visit, Male    The best way to live healthy is to have a healthy lifestyle by eating a well-balanced diet, exercising regularly, limiting alcohol and stopping smoking. Regular physical exams and screening tests are another way to keep healthy. Preventive exams provided by your health care provider can find health problems before they become diseases or illnesses. Preventive services including immunizations, screening tests, monitoring and exams can help you take care of your own health. All people over age 72 should have a pneumovax  and and a prevnar shot to prevent pneumonia. These are once in a lifetime unless you and your provider decide differently. All people over 65 should have a yearly flu shot and a tetanus vaccine every 10 years. Screening for diabetes mellitus with a blood sugar test should be done every year. Glaucoma is a disease of the eye due to increased ocular pressure that can lead to blindness and it should be done every year by an eye professional.    Cardiovascular screening tests that check for elevated lipids (fatty part of blood) which can lead to heart disease and strokes should be done every 5 years. Colorectal screening that evaluates for blood or polyps in your colon should be done yearly as a stool test or every five years as a flexible sigmoidoscope or every 10 years as a colonoscopy up to age 76. Men up to age 76 may need a screening blood test for prostate cancer at certain intervals, depending on their personal and family history. This decision is between the patient and his provider. If you have been a smoker or had family history of abdominal aortic aneurysms, you and your provider may decide to schedule an ultrasound test of your aorta. Hepatitis C screening is also recommended for anyone born between [birth year] through [address]. A shingles vaccine is also recommended once in a lifetime after age 61. Your Medicare Wellness Exam is recommended annually.     Here is a list of your current Health Maintenance items with a due date:  Health Maintenance Due   Topic Date Due    Shingles Vaccine  08/23/2008    Glaucoma Screening   10/23/2013    Pneumococcal Vaccine (1 of 2 - PCV13) 10/23/2013    Annual Well Visit  10/23/2013    Flu Vaccine  08/01/2017           Medicare Wellness Visit, Male    The best way to live healthy is to have a healthy lifestyle by eating a well-balanced diet, exercising regularly, limiting alcohol and stopping smoking. Regular physical exams and screening tests are another way to keep healthy. Preventive exams provided by your health care provider can find health problems before they become diseases or illnesses. Preventive services including immunizations, screening tests, monitoring and exams can help you take care of your own health. All people over age 72 should have a pneumovax  and and a prevnar shot to prevent pneumonia. These are once in a lifetime unless you and your provider decide differently. All people over 65 should have a yearly flu shot and a tetanus vaccine every 10 years. Screening for diabetes mellitus with a blood sugar test should be done every year. Glaucoma is a disease of the eye due to increased ocular pressure that can lead to blindness and it should be done every year by an eye professional.    Cardiovascular screening tests that check for elevated lipids (fatty part of blood) which can lead to heart disease and strokes should be done every 5 years. Colorectal screening that evaluates for blood or polyps in your colon should be done yearly as a stool test or every five years as a flexible sigmoidoscope or every 10 years as a colonoscopy up to age 76. Men up to age 76 may need a screening blood test for prostate cancer at certain intervals, depending on their personal and family history. This decision is between the patient and his provider.     If you have been a smoker or had family history of abdominal aortic aneurysms, you and your provider may decide to schedule an ultrasound test of your aorta. Hepatitis C screening is also recommended for anyone born between [birth year] through [address]. A shingles vaccine is also recommended once in a lifetime after age 61. Your Medicare Wellness Exam is recommended annually. Here is a list of your current Health Maintenance items with a due date:  Health Maintenance Due   Topic Date Due    Shingles Vaccine  08/23/2008    Glaucoma Screening   10/23/2013    Annual Well Visit  10/23/2013         Subjective:   HPI:  Nelsy Bruner is a 74 y.o. male being seen for:   Chief Complaint   Patient presents with    Complete Physical    Chronic Kidney Disease     stated that he is in his final stage of renal failure      Past medical history, surgical history, social history, family history, medications, allergies reviewed and updated. See below for more detail. Drinking premier protein drinks b/c protein has been low  Waiting for kidney transplant. Undergone testing 2017. Doing PD daily at home. Independently. 2-8 hrs depending on manual vs machine depending on schedule  Dr. Reji Gaitan once a month and once a month dialysis nurse, SW, nutritionist (alternating q2 weeks). Blood work monthly when he sees nurse. Working full time still from 2am until 1pm. 6 days a week. He checks his BP and T daily and they are reviewed by nurse. Also checks weight. Usually SBP in 120s, but creeping up some in the last week or so. Max 150s  SBP has gotten to 90/60 only a few times. Screening and Prevention Due:  Health Maintenance Due   Topic Date Due    ZOSTER VACCINE AGE 60>  08/23/2008    GLAUCOMA SCREENING Q2Y  10/23/2013    MEDICARE YEARLY EXAM  10/23/2013         Review of Systems  Otherwise as noted in HPI  ?   Objective:     Visit Vitals    BP (!) 151/92 (BP 1 Location: Left arm, BP Patient Position: Sitting)    Pulse 65    Temp 97.5 °F (36.4 °C) (Oral)    Resp 18    Ht 5' 8\" (1.727 m)    Wt 191 lb (86.6 kg)    SpO2 98%  BMI 29.04 kg/m2     Wt Readings from Last 3 Encounters:   09/07/17 191 lb (86.6 kg)   05/27/17 187 lb (84.8 kg)   03/16/17 185 lb (83.9 kg)     BP Readings from Last 3 Encounters:   09/07/17 (!) 151/92   05/27/17 148/73   03/16/17 (!) 180/97     PHQ over the last two weeks 9/7/2017   Little interest or pleasure in doing things Not at all   Feeling down, depressed or hopeless Not at all   Total Score PHQ 2 0         Physical Exam   Constitutional: He appears well-developed and well-nourished. No distress. Pulmonary/Chest: Effort normal. No respiratory distress. Neurological: He is alert. Psychiatric: He has a normal mood and affect. His behavior is normal.       Allergies   Allergen Reactions    Amlodipine Swelling    Other Medication Other (comments)     Eye Drop 2002- Caused pain, redness and discomfort     Prior to Admission medications    Medication Sig Start Date End Date Taking? Authorizing Provider   calcium acetate (PHOSLO) 667 mg cap Take  by mouth three (3) times daily (with meals). Yes Historical Provider   carvedilol (COREG) 25 mg tablet Take 25 mg by mouth two (2) times daily (with meals). Yes Historical Provider   simvastatin (ZOCOR) 40 mg tablet TAKE ONE TABLET BY MOUTH NIGHTLY 7/14/17  Yes Stuart Severino. Pamela Garcia MD   B COMPLEX W-C NO.20/FOLIC ACID (RENAL CAPS PO) Take  by mouth. Yes Calin Crockett MD   calcitRIOL (ROCALTROL) 0.5 mcg capsule Take 0.5 mcg by mouth daily. Yes Calin Crockett MD   cloNIDine HCl (CATAPRES) 0.1 mg tablet Take 0.2 mg by mouth two (2) times daily as needed. Indications: prn  or higher   Yes Historical Provider   cholecalciferol (VITAMIN D3) 1,000 unit tablet Take 1,000 Units by mouth daily.    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 Social History     Social History    Marital status:      Spouse name: Nilesh Bonilla    Number of children: 2    Years of education: N/A     Occupational History          [employer]     Social History Main Topics    Smoking status: Former Smoker     Packs/day: 0.50     Years: 3.00     Types: Cigarettes     Quit date: 1/1/1987    Smokeless tobacco: Never Used    Alcohol use No      Comment: used to have drinks a day for 10 years. stopped  around 2000.  Drug use: No    Sexual activity: Yes     Partners: Female      Comment: monogamous with wife     Other Topics Concern    Not on file     Social History Narrative    Lives with wife. Two adult sons live close       Past Medical History:   Diagnosis Date    Anemia in chronic kidney disease (CKD)     Arthritis     hands, knees, ankles- gout d/t CKD    Benign hypertensive renal disease 3/18/16    Dr. Adam Chávez Chronic kidney disease, stage III (moderate) 03/18/2016    Dr. Candie Champagne PD since 2/2017 after having catheter placed    Cold sore     right side of mouth, rare    GERD (gastroesophageal reflux disease)     hiatal hernia.      Gout 2013    Hiatal hernia     Hyperlipidemia     Hypertension     MGUS (monoclonal gammopathy of unknown significance) 3/22/2016    Persistent proteinuria     with m-spike, not seen 5/2013    Prediabetes     Proteinuria 3/18/16    Dr. Adam Chávez Renal failure     Right knee pain 4/13/16    Dr. Dino Munoz    Vitamin D deficiency        Past Surgical History:   Procedure Laterality Date    ABDOMEN SURGERY PROC UNLISTED  02/24/2017    Laparoscopic insertion of peritoneal dialysis catheter    COLONOSCOPY N/A 3/16/2017    COLONOSCOPY performed by Nelda Shelley MD at [facility]  3/16/2017         ENDOSCOPY, COLON, DIAGNOSTIC  \"94, '05    Normal - repeat in 2014-15    HX ENDOSCOPY  2006/7    d/t dysphagia. had dilation.  HX HEENT  1956    tonsillectomy (4 yrs old)   [facility] HX HERNIA REPAIR  1969    Right inguinal hernia repair           Family History   Problem Relation Age of Onset    Dementia Mother 80    Stroke Father 64     ?etiology    Hypertension Father     Sleep Apnea Son     No Known Problems Son     No Known Problems Maternal Grandmother     No Known Problems Maternal Grandfather     No Known Problems Paternal Grandmother     No Known Problems Paternal Grandfather      ._____________________     . Minor Ronde This is an Initial Medicare Annual Wellness Exam (AWV) (Performed 12 months after IPPE or effective date of Medicare Part B enrollment, Once in a lifetime)    I have reviewed the patient's medical history in detail and updated the computerized patient record. History     Past Medical History:   Diagnosis Date    Anemia in chronic kidney disease (CKD)     Arthritis     hands, knees, ankles- gout d/t CKD    Benign hypertensive renal disease 3/18/16    Dr. Sade Muller Chronic kidney disease, stage III (moderate) 03/18/2016    Dr. Valente Chisholm PD since 2/2017 after having catheter placed    Cold sore     right side of mouth, rare    GERD (gastroesophageal reflux disease)     hiatal hernia.      Gout 2013    Hiatal hernia     Hyperlipidemia     Hypertension     MGUS (monoclonal gammopathy of unknown significance) 3/22/2016    Persistent proteinuria     with m-spike, not seen 5/2013    Prediabetes     Proteinuria 3/18/16    Dr. Sade Muller Renal failure     Right knee pain 4/13/16    Dr. Jose A Martinez    Vitamin D deficiency       Past Surgical History:   Procedure Laterality Date    ABDOMEN SURGERY PROC UNLISTED 02/24/2017    Laparoscopic insertion of peritoneal dialysis catheter    COLONOSCOPY N/A 3/16/2017    COLON[address] performed [address] Samira Liriano MD [address]  3/16/2017         ENDOSCOPY, COLON, DIAGNOSTIC  \"94, '05    Normal - repeat in 2014-15    HX ENDOSCOPY  2006/7    d/t dysphagia. had dilation.  HX HEENT  1956    tonsillectomy (4 yrs old)   [address]    Right inguinal hernia repair     Current Outpatient Prescriptions   Medication Sig Dispense Refill    calcium acetate (PHOSLO) 667 mg cap Take  by mouth three (3) times daily (with meals).  carvedilol (COREG) 25 mg tablet Take 25 mg by mouth two (2) times daily (with meals).  simvastatin (ZOCOR) 40 mg tablet TAKE ONE TABLET BY MOUTH NIGHTLY 90 Tab 3    B COMPLEX W-C NO.20/FOLIC ACID (RENAL CAPS PO) Take  by mouth.  calcitRIOL (ROCALTROL) 0.5 mcg capsule Take 0.5 mcg by mouth daily.  cloNIDine HCl (CATAPRES) 0.1 mg tablet Take 0.2 mg by mouth two (2) times daily as needed. Indications: prn  or higher      cholecalciferol (VITAMIN D3) 1,000 unit tablet Take 1,000 Units by mouth daily. Allergies   Allergen Reactions    Amlodipine Swelling    Other Medication Other (comments)     Eye Drop 2002- Caused pain, redness and discomfort     Family History   Problem Relation Age of Onset    Dementia Mother 80    Stroke Father 64     ?etiology    Hypertension Father     Sleep Apnea Son     No Known Problems Son     No Known Problems Maternal Grandmother     No Known Problems Maternal Grandfather     No Known Problems Paternal Grandmother     No Known Problems Paternal Grandfather      Social History   Substance Use Topics    Smoking status: Former Smoker     Packs/day: 0.50     Years: 3.00     Types: Cigarettes     Quit date: 1/1/1987    Smokeless tobacco: Never Used    Alcohol use No      Comment: used to have drinks a day for 10 years. stopped  around 2000.      Patient Active Problem List   Diagnosis Code    Chronic gout due to renal impairment of multiple sites with tops M1A.39X1    Proteinuria R80.9    HTN (hypertension) I10    Hyperlipidemia E78.5    Prediabetes R73.03    MGUS (monoclonal gammopathy of unknown significance) D47.2    Primary osteoarthritis of both knees M17.0    CKD (chronic kidney disease) stage 5, GFR less than 15 ml/min (HCC) N18.5    Anemia in chronic kidney disease (CKD) N18.9, D63.1    Hypertension I10    Vitamin D deficiency E55.9         Depression Risk Factor Screening:     PHQ over the last two weeks 9/7/2017   Little interest or pleasure in doing things Not at all   Feeling down, depressed or hopeless Not at all   Total Score PHQ 2 0     Alcohol Risk Factor Screening:     History   Alcohol Use No     Comment: used to have drinks a day for 10 years. stopped  around 2000. Functional Ability and Level of Safety:     Hearing Loss   Denies hearing loss    Activities of Daily Living     ADL Assessment 9/7/2017   Feeding yourself No Help Needed   Getting from bed to chair No Help Needed   Getting dressed No Help Needed   Bathing or showering No Help Needed   Walk across the room (includes cane/walker) No Help Needed   Using the telphone No Help Needed   Taking your medications No Help Needed   Preparing meals No Help Needed   Managing money (expenses/bills) No Help Needed   Moderately strenuous housework (laundry) No Help Needed   Shopping for personal items (toiletries/medicines) No Help Needed   Shopping for groceries No Help Needed   Driving No Help Needed   Climbing a flight of stairs No Help Needed   Getting to places beyond walking distances No Help Needed       Fall Risk     Fall Risk Assessment, last 12 mths 9/7/2017   Able to walk? Yes   Fall in past 12 months? No     Abuse Screen     Abuse Screening Questionnaire 9/7/2017   Do you ever feel afraid of your partner?  N   Are you in a relationship with someone who physically or mentally threatens you? N   Is it safe for you to go home? Y       Review of Systems   See above     Physical Examination     No exam data present   Vision pending    Evaluation of Cognitive Function:  Mini Cog score: pending    Patient Care Team:  Kory Mcgill. Sary Gonzalez MD as PCP - General (Family Practice)  Naina Johnson MD as Physician (Nephrology)  Nate Garcia MD as Physician (Orthopedic Surgery)  Evette Kumari MD as Physician (Sleep Medicine)  Ban Gallardo MD as Surgeon (General Surgery)    Advice/Referrals/Counseling   Education and counseling provided:  · HM reviewed with due dates: See orders and patient instructions for more  · Personalized Health Advice provided  · Risk factors and management reviewed   · Depression Screening   · Smoking Cessation if applicable  · Vision screening  · Alcohol Screening  · ACP Counseling given with patient consent    Health Maintenance Due   Topic Date Due    Shingles Vaccine  08/23/2008    Glaucoma Screening   10/23/2013    Annual Well Visit  10/23/2013   .   Immunization History   Administered Date(s) Administered    Tdap 09/11/2013       Assessment/Plan   See other

## 2017-09-07 NOTE — ACP (ADVANCE CARE PLANNING)
.Advance Care Planning (ACP) Provider Conversation Snapshot    Date of ACP Conversation: 09/07/17  Persons included in Conversation:  patient  Length of ACP Conversation in minutes:  <16 minutes (Non-Billable)    Authorized Decision Maker (if patient is incapable of making informed decisions): This person is:   Wife, Geetha Cruz          For Patients with Decision Making Capacity:   Values/Goals: Exploration of values, goals, and preferences if recovery is not expected, even with continued medical treatment in the event of:  Imminent death  Severe, permanent brain injury    Conversation Outcomes / Follow-Up Plan:   Recommended completion of Advance Directive form after review of ACP materials and conversation with prospective healthcare agent   Referral made for ACP follow-up assistance to:  Certified ACP Facilitator    Zoya Srinivasan was referred to [employer] today and given information packet.

## 2017-09-07 NOTE — PATIENT INSTRUCTIONS
.. TODAY, please go to:   CHECK OUT    Repeat BP   Vision test   Mini cog   Release of records for eye doctor    Please schedule the following appointments at [address] [place] [address]:  · Cholesterol and blood sugar follow up with Dr. Sary Gonzalez in Nov 2017  · Lab visit, fasting the week before our visit. · Honoring Choices Appointment with Nurse Navigator in Oct 2017      Today's Plan:  - Flu season is coming! Remember to get your flu vaccine! Medicare Wellness Visit, Male    The best way to live healthy is to have a healthy lifestyle by eating a well-balanced diet, exercising regularly, limiting alcohol and stopping smoking. Regular physical exams and screening tests are another way to keep healthy. Preventive exams provided by your health care provider can find health problems before they become diseases or illnesses. Preventive services including immunizations, screening tests, monitoring and exams can help you take care of your own health. All people over age 72 should have a pneumovax  and and a prevnar shot to prevent pneumonia. These are once in a lifetime unless you and your provider decide differently. All people over 65 should have a yearly flu shot and a tetanus vaccine every 10 years. Screening for diabetes mellitus with a blood sugar test should be done every year. Glaucoma is a disease of the eye due to increased ocular pressure that can lead to blindness and it should be done every year by an eye professional.    Cardiovascular screening tests that check for elevated lipids (fatty part of blood) which can lead to heart disease and strokes should be done every 5 years. Colorectal screening that evaluates for blood or polyps in your colon should be done yearly as a stool test or every five years as a flexible sigmoidoscope or every 10 years as a colonoscopy up to age 76.     Men up to age 76 may need a screening blood test for prostate cancer at certain intervals, depending on their personal and family history. This decision is between the patient and his provider. If you have been a smoker or had family history of abdominal aortic aneurysms, you and your provider may decide to schedule an ultrasound test of your aorta. Hepatitis C screening is also recommended for anyone born between [birth year] through [address]. A shingles vaccine is also recommended once in a lifetime after age 61. Your Medicare Wellness Exam is recommended annually. Here is a list of your current Health Maintenance items with a due date:  Health Maintenance Due   Topic Date Due    Shingles Vaccine  08/23/2008    Glaucoma Screening   10/23/2013    Pneumococcal Vaccine (1 of 2 - PCV13) 10/23/2013    Annual Well Visit  10/23/2013    Flu Vaccine  08/01/2017           Medicare Wellness Visit, Male    The best way to live healthy is to have a healthy lifestyle by eating a well-balanced diet, exercising regularly, limiting alcohol and stopping smoking. Regular physical exams and screening tests are another way to keep healthy. Preventive exams provided by your health care provider can find health problems before they become diseases or illnesses. Preventive services including immunizations, screening tests, monitoring and exams can help you take care of your own health. All people over age 72 should have a pneumovax  and and a prevnar shot to prevent pneumonia. These are once in a lifetime unless you and your provider decide differently. All people over 65 should have a yearly flu shot and a tetanus vaccine every 10 years. Screening for diabetes mellitus with a blood sugar test should be done every year.     Glaucoma is a disease of the eye due to increased ocular pressure that can lead to blindness and it should be done every year by an eye professional.    Cardiovascular screening tests that check for elevated lipids (fatty part of blood) which can lead to heart disease and strokes should be done every 5 years. Colorectal screening that evaluates for blood or polyps in your colon should be done yearly as a stool test or every five years as a flexible sigmoidoscope or every 10 years as a colonoscopy up to age 76. Men up to age 76 may need a screening blood test for prostate cancer at certain intervals, depending on their personal and family history. This decision is between the patient and his provider. If you have been a smoker or had family history of abdominal aortic aneurysms, you and your provider may decide to schedule an ultrasound test of your aorta. Hepatitis C screening is also recommended for anyone born between [birth year] through [address]. A shingles vaccine is also recommended once in a lifetime after age 61. Your Medicare Wellness Exam is recommended annually.     Here is a list of your current Health Maintenance items with a due date:  Health Maintenance Due   Topic Date Due    Shingles Vaccine  08/23/2008    Glaucoma Screening   10/23/2013    Annual Well Visit  10/23/2013

## 2017-09-07 NOTE — PROGRESS NOTES
Chief Complaint   Patient presents with    Complete Physical    Chronic Kidney Disease     stated that he is in his final stage of renal failure      1. Have you been to the ER, urgent care clinic since your last visit? Hospitalized since your last visit? Yes, ER visit for gout related issues     2. Have you seen or consulted any other health care providers outside of the [address] [place] [address] since your last visit? Include any pap smears or colon screening. Yes, urologist     The patient was counseled on the dangers of tobacco use, and was advised never to start again. Reviewed strategies to maximize success, including never to start. Health Maintenance Due   Topic Date Due    ZOSTER VACCINE AGE 60> Discussed with patient today and advised to follow up. Patient declines. 08/23/2008    GLAUCOMA SCREENING Q2Y Discussed with patient today and advised to follow up.    10/23/2013    Pneumococcal 65+ High/Highest Risk (1 of 2 - PCV13) Discussed with patient today and advised to follow up. Patient declines. 10/23/2013    MEDICARE YEARLY EXAM Discussed with patient today and advised to follow up.    10/23/2013    INFLUENZA AGE 9 TO ADULT Discussed with patient today and advised to follow up. Patient declines. 08/01/2017     Health Maintenance  · Diet:   · What do you want to improve about your diet? Eat on a more regular basis   · What is going well? Has one that appears to be working, for the goals that have been set for it. · Any foods you do not eat at all? Tries to stay away from shellfish   · Exercise:  · What do you want to improve about your physical activity? Find the time to exercise   · What is going well? More ability   · Dental screening:   · Brushing twice a day? No, once daily   · Seeing Dentist every 6 months? No, annually   · Vision screening:   · Glasses or contacts? Yes   · Seeing eye doctor at least every 2 years? Yes       ACP is not on file, advised to return.

## 2017-09-07 NOTE — MR AVS SNAPSHOT
Visit Information Date & Time Provider Department Dept. Phone Encounter #  
 9/7/2017  1:40 PM Willis Medina. Neema Hunt MD [place] [phone] [id number] Upcoming Health Maintenance Date Due ZOSTER VACCINE AGE 60> 8/23/2008 GLAUCOMA SCREENING Q2Y 10/23/2013 MEDICARE YEARLY EXAM 10/23/2013 Pneumococcal 65+ High/Highest Risk (2 of 2 - PPSV23) 11/2/2017 COLONOSCOPY 3/16/2020 DTaP/Tdap/Td series (2 - Td) 9/11/2023 Allergies as of 9/7/2017  Review Complete On: 9/7/2017 By: Willis Medina. Neema Hunt MD  
  
 Severity Noted Reaction Type Reactions Amlodipine  07/23/2013    Swelling Other Medication  05/24/2013    Other (comments) Eye Drop 2002- Caused pain, redness and discomfort Current Immunizations  Reviewed on 10/10/2016 Name Date Tdap 9/11/2013 Not reviewed this visit You Were Diagnosed With   
  
 Codes Comments Chronic kidney disease, stage III (moderate)     ICD-10-CM: N18.3 ICD-9-CM: 394. 3 Vitals BP Pulse Temp Resp Height(growth percentile) Weight(growth percentile) (!) 151/92 (BP 1 Location: Left arm, BP Patient Position: Sitting) 65 97.5 °F (36.4 °C) (Oral) 18 5' 8\" (1.727 m) 191 lb (86.6 kg) SpO2 BMI Smoking Status 98% 29.04 kg/m2 Former Smoker BMI and BSA Data Body Mass Index Body Surface Area 29.04 kg/m 2 2.04 m 2 Preferred Pharmacy Pharmacy Name Phone [facility] [address] [place] [address] [phone] Your Updated Medication List  
  
   
This list is accurate as of: 9/7/17  2:58 PM.  Always use your most recent med list.  
  
  
  
  
 calcitRIOL 0.5 mcg capsule Commonly known as:  ROCALTROL Take 0.5 mcg by mouth daily. calcium acetate 667 mg Cap Commonly known as:  PHOSLO Take  by mouth three (3) times daily (with meals). carvedilol 25 mg tablet Commonly known as:  Liset Salts  
 Take 25 mg by mouth two (2) times daily (with meals). cholecalciferol 1,000 unit tablet Commonly known as:  VITAMIN D3 Take 1,000 Units by mouth daily. cloNIDine HCl 0.1 mg tablet Commonly known as:  CATAPRES Take 0.2 mg by mouth two (2) times daily as needed. Indications: prn  or higher RENAL CAPS PO Take  by mouth. simvastatin 40 mg tablet Commonly known as:  ZOCOR  
TAKE ONE TABLET BY MOUTH NIGHTLY Patient Instructions Simran Ha TODAY, please go to: CHECK OUT Repeat BP Vision test 
 [facility] Release of records for eye doctor Please schedule the following appointments at [address] [place] [address]: 
· Cholesterol and blood sugar follow up with Dr. Neema Hunt in Nov 2017 · Lab visit, fasting the week before our visit. · [facility]oring Choices Appointment with Nurse Navigator in Oct 2017 Today's Plan: - Flu season is coming! Remember to get your flu vaccine! Medicare Wellness Visit, Male The best way to live healthy is to have a healthy lifestyle by eating a well-balanced diet, exercising regularly, limiting alcohol and stopping smoking. Regular physical exams and screening tests are another way to keep healthy. Preventive exams provided by your health care provider can find health problems before they become diseases or illnesses. Preventive services including immunizations, screening tests, monitoring and exams can help you take care of your own health. All people over age 72 should have a pneumovax  and and a prevnar shot to prevent pneumonia. These are once in a lifetime unless you and your provider decide differently. All people over 65 should have a yearly flu shot and a tetanus vaccine every 10 years. Screening for diabetes mellitus with a blood sugar test should be done every year.  
 
Glaucoma is a disease of the eye due to increased ocular pressure that can lead to blindness and it should be done every year by an eye professional. 
 
 Cardiovascular screening tests that check for elevated lipids (fatty part of blood) which can lead to heart disease and strokes should be done every 5 years. Colorectal screening that evaluates for blood or polyps in your colon should be done yearly as a stool test or every five years as a flexible sigmoidoscope or every 10 years as a colonoscopy up to age 76. Men up to age 76 may need a screening blood test for prostate cancer at certain intervals, depending on their personal and family history. This decision is between the patient and his provider. If you have been a smoker or had family history of abdominal aortic aneurysms, you and your provider may decide to schedule an ultrasound test of your aorta. Hepatitis C screening is also recommended for anyone born between [birth year] through [address]. A shingles vaccine is also recommended once in a lifetime after age 61. Your Medicare Wellness Exam is recommended annually. Here is a list of your current Health Maintenance items with a due date: 
Health Maintenance Due Topic Date Due  Shingles Vaccine  08/23/2008  Glaucoma Screening   10/23/2013  Pneumococcal Vaccine (1 of 2 - PCV13) 10/23/2013 [address] [place] [address] Annual Well Visit  10/23/2013  Flu Vaccine  08/01/2017 Introducing [facility] & HEALTH SERVICES! Dear Christine Cuellar: 
Thank you for requesting a [employer] account. Our records indicate that you already have an active [employer] account. You can access your account anytime at [email or web address] Did you know that you can access your hospital and ER discharge instructions at any time in [employer]? You can also review all of your test results from your hospital stay or ER visit. Additional Information If you have questions, please visit the Frequently Asked Questions section of the [employer] website at [email or web address] Remember, [employer] is NOT to be used for urgent needs. For medical emergencies, dial 911. Now available from your iPhone and Android! Please provide this summary of care documentation to your next provider. Your primary care clinician is listed as Ceci . If you have any questions after today's visit, please call [phone].

## 2017-11-08 LAB
25(OH)D3+25(OH)D2 SERPL-MCNC: 25 NG/ML (ref 30–100)
ALBUMIN SERPL-MCNC: 3.4 G/DL (ref 3.6–4.8)
ALBUMIN/GLOB SERPL: 1.5 {RATIO} (ref 1.2–2.2)
ALP SERPL-CCNC: 45 IU/L (ref 39–117)
ALT SERPL-CCNC: 21 IU/L (ref 0–44)
AST SERPL-CCNC: 17 IU/L (ref 0–40)
BILIRUB SERPL-MCNC: 0.3 MG/DL (ref 0–1.2)
BUN SERPL-MCNC: 64 MG/DL (ref 8–27)
BUN/CREAT SERPL: 8 (ref 10–24)
CALCIUM SERPL-MCNC: 8.1 MG/DL (ref 8.6–10.2)
CHLORIDE SERPL-SCNC: 102 MMOL/L (ref 96–106)
CHOLEST SERPL-MCNC: 155 MG/DL (ref 100–199)
CO2 SERPL-SCNC: 22 MMOL/L (ref 18–29)
CREAT SERPL-MCNC: 7.94 MG/DL (ref 0.76–1.27)
ERYTHROCYTE [DISTWIDTH] IN BLOOD BY AUTOMATED COUNT: 14.4 % (ref 12.3–15.4)
EST. AVERAGE GLUCOSE BLD GHB EST-MCNC: 126 MG/DL
GFR SERPLBLD CREATININE-BSD FMLA CKD-EPI: 6 ML/MIN/1.73
GFR SERPLBLD CREATININE-BSD FMLA CKD-EPI: 7 ML/MIN/1.73
GLOBULIN SER CALC-MCNC: 2.3 G/DL (ref 1.5–4.5)
GLUCOSE SERPL-MCNC: 76 MG/DL (ref 65–99)
HBA1C MFR BLD: 6 % (ref 4.8–5.6)
HCT VFR BLD AUTO: 31 % (ref 37.5–51)
HDLC SERPL-MCNC: 39 MG/DL
HGB BLD-MCNC: 9.8 G/DL (ref 12.6–17.7)
INTERPRETATION, 910389: NORMAL
INTERPRETATION: NORMAL
LDLC SERPL CALC-MCNC: 99 MG/DL (ref 0–99)
MCH RBC QN AUTO: 27.8 PG (ref 26.6–33)
MCHC RBC AUTO-ENTMCNC: 31.6 G/DL (ref 31.5–35.7)
MCV RBC AUTO: 88 FL (ref 79–97)
PDF IMAGE, 910387: NORMAL
PLATELET # BLD AUTO: 166 X10E3/UL (ref 150–379)
POTASSIUM SERPL-SCNC: 5.7 MMOL/L (ref 3.5–5.2)
PROT SERPL-MCNC: 5.7 G/DL (ref 6–8.5)
RBC # BLD AUTO: 3.53 X10E6/UL (ref 4.14–5.8)
SODIUM SERPL-SCNC: 142 MMOL/L (ref 134–144)
TRIGL SERPL-MCNC: 86 MG/DL (ref 0–149)
VLDLC SERPL CALC-MCNC: 17 MG/DL (ref 5–40)
WBC # BLD AUTO: 9.7 X10E3/UL (ref 3.4–10.8)

## 2017-11-08 NOTE — PROGRESS NOTES
Cr and K elevated. Known CKD stg 5. Pt is on daily peritoneal dialysis. Will review remaining labs with patient in detail on follow up.

## 2017-11-14 ENCOUNTER — OFFICE VISIT (OUTPATIENT)
Dept: FAMILY MEDICINE CLINIC | Age: 67
End: 2017-11-14

## 2017-11-14 VITALS
TEMPERATURE: 97.9 F | DIASTOLIC BLOOD PRESSURE: 74 MMHG | HEIGHT: 68 IN | SYSTOLIC BLOOD PRESSURE: 158 MMHG | RESPIRATION RATE: 18 BRPM | OXYGEN SATURATION: 97 % | HEART RATE: 66 BPM | BODY MASS INDEX: 29.55 KG/M2 | WEIGHT: 195 LBS

## 2017-11-14 DIAGNOSIS — Z76.82 KIDNEY TRANSPLANT CANDIDATE: ICD-10-CM

## 2017-11-14 DIAGNOSIS — R73.03 PREDIABETES: ICD-10-CM

## 2017-11-14 DIAGNOSIS — N18.5 CKD (CHRONIC KIDNEY DISEASE) STAGE 5, GFR LESS THAN 15 ML/MIN (HCC): ICD-10-CM

## 2017-11-14 DIAGNOSIS — E78.5 HYPERLIPIDEMIA, UNSPECIFIED HYPERLIPIDEMIA TYPE: ICD-10-CM

## 2017-11-14 DIAGNOSIS — I10 ESSENTIAL HYPERTENSION: Primary | ICD-10-CM

## 2017-11-14 NOTE — PROGRESS NOTES
Chief Complaint   Patient presents with    Cholesterol Problem     F/U    Results     Labs     Blood sugar problem     F/U     1. Have you been to the ER, urgent care clinic since your last visit? Hospitalized since your last visit? No     2. Have you seen or consulted any other health care providers outside of the [address] [place] [address] since your last visit? Include any pap smears or colon screening. Yes, nephrologist doc and dialysis     The patient was counseled on the dangers of tobacco use, and was advised to never start again. Reviewed strategies to maximize success, including never to start again. Health Maintenance Due   Topic Date Due    ZOSTER VACCINE AGE 60> Discussed with patient today and advised to follow up.    08/23/2008    GLAUCOMA SCREENING Q2Y Discussed with patient today and advised to follow up.    10/23/2013    Pneumococcal 65+ High/Highest Risk (2 of 2 - PPSV23) Discussed with patient today and advised to follow up.    11/02/2017     ACP is not on file, advised to return.

## 2017-11-14 NOTE — LETTER
11/14/2017 2:22 PM 
 
Mr. Tara Deleonw[address]str[address] [phone] Office Visit on 09/07/2017 Component Date Value Ref Range Status  Glucose 11/07/2017 76  65 - 99 mg/dL Final  
 BUN 11/07/2017 64* 8 - 27 mg/dL Final  
 Creatinine 11/07/2017 7.94* 0.76 - 1.27 mg/dL Final  
 GFR est non-AA 11/07/2017 6* >59 mL/min/1.73 Final  
 GFR est AA 11/07/2017 7* >59 mL/min/1.73 Final  
 BUN/Creatinine ratio 11/07/2017 8* 10 - 24 Final  
 Sodium 11/07/2017 142  134 - 144 mmol/L Final  
 Potassium 11/07/2017 5.7* 3.5 - 5.2 mmol/L Final  
 Chloride 11/07/2017 102  96 - 106 mmol/L Final  
 CO2 11/07/2017 22  18 - 29 mmol/L Final  
 Calcium 11/07/2017 8.1* 8.6 - 10.2 mg/dL Final  
 Protein, total 11/07/2017 5.7* 6.0 - 8.5 g/dL Final  
 Albumin 11/07/2017 3.4* 3.6 - 4.8 g/dL Final  
 GLOBULIN, TOTAL 11/07/2017 2.3  1.5 - 4.5 g/dL Final  
 A-G Ratio 11/07/2017 1.5  1.2 - 2.2 Final  
 Bilirubin, total 11/07/2017 0.3  0.0 - 1.2 mg/dL Final  
 Alk.  phosphatase 11/07/2017 45  39 - 117 IU/L Final  
 AST (SGOT) 11/07/2017 17  0 - 40 IU/L Final  
 ALT (SGPT) 11/07/2017 21  0 - 44 IU/L Final  
 WBC 11/07/2017 9.7  3.4 - 10.8 x10E3/uL Final  
 RBC 11/07/2017 3.53* 4.14 - 5.80 x10E6/uL Final  
 HGB 11/07/2017 9.8* 12.6 - 17.7 g/dL Final  
 HCT 11/07/2017 31.0* 37.5 - 51.0 % Final  
 MCV 11/07/2017 88  79 - 97 fL Final  
 MCH 11/07/2017 27.8  26.6 - 33.0 pg Final  
 MCHC 11/07/2017 31.6  31.5 - 35.7 g/dL Final  
 RDW 11/07/2017 14.4  12.3 - 15.4 % Final  
 PLATELET 52/90/5478 253  150 - 379 x10E3/uL Final  
 Hemoglobin A1c 11/07/2017 6.0* 4.8 - 5.6 % Final  
 Estimated average glucose 11/07/2017 126  mg/dL Final  
 Cholesterol, total 11/07/2017 155  100 - 199 mg/dL Final  
 Triglyceride 11/07/2017 86  0 - 149 mg/dL Final  
 HDL Cholesterol 11/07/2017 39* >39 mg/dL Final  
 VLDL, calculated 11/07/2017 17  5 - 40 mg/dL Final  
 LDL, calculated 11/07/2017 99  0 - 99 mg/dL Final  
  VITAMIN D, 25-HYDROXY 11/07/2017 25.0* 30.0 - 100.0 ng/mL Final  
 INTERPRETATION 11/07/2017 Note   Final  
 PDF IMAGE 23/16/7998 Not applicable   Final  
 Interpretation 11/07/2017 Note   Final  
 
 
 
Legend: Use this to help understand your labs. You may not have all of these ordered · WBC- White blood cell count · HGB- Hemoglobin, red blood cell count · HCT- Hematocrit, red blood cell count · PLT- Platelets · Sodium, Potassium, Chloride, Magnesium- electrolytes · Creatinine, GFR- kidney function · AST, ALT, Alkaline phosphatase, bilirubin- liver and gallbladder · Lipase- pancreas · RPR- Syphilis test, STD 
· HIV, Gonorrhea, Chlamydia, Trichomonas- STDs · Candida- yeast infection · Nuswab- vaginal infections · Urinalysis- a quick test about your urine · Hemoglobin A1C- diabetes test 
· Glucose- blood sugar · HDL- \"Good\" Cholesterol. Goal >=40 
· LDL- \"Bad\" Cholesterol. Goal <100 · Triglycerides- Goal <150 · Vit D- Vitamin D level · TSH, FT3, FT4- thyroid tests · HCV Ab- test for Hepatitis C  
· Sincerely, 
 
 
Medhat Conway MD

## 2017-11-14 NOTE — MR AVS SNAPSHOT
Visit Information Date & Time Provider Department Dept. Phone Encounter #  
 11/14/2017  2:00 PM MD Leon LylesRosemarie [phone] [id number] Upcoming Health Maintenance Date Due ZOSTER VACCINE AGE 60> 8/23/2008 GLAUCOMA SCREENING Q2Y 10/23/2013 MEDICARE YEARLY EXAM 9/8/2018 COLONOSCOPY 3/16/2020 DTaP/Tdap/Td series (2 - Td) 9/11/2023 Allergies as of 11/14/2017  Review Complete On: 11/14/2017 By: [facility], LPN Severity Noted Reaction Type Reactions Amlodipine  07/23/2013    Swelling Other Medication  05/24/2013    Other (comments) Eye Drop 2002- Caused pain, redness and discomfort Current Immunizations  Reviewed on 10/10/2016 Name Date Tdap 9/11/2013 Not reviewed this visit Vitals BP Pulse Temp Resp Height(growth percentile) Weight(growth percentile) 158/74 (BP 1 Location: Right arm, BP Patient Position: Sitting) 66 97.9 °F (36.6 °C) (Oral) 18 5' 8\" (1.727 m) 195 lb (88.5 kg) SpO2 BMI Smoking Status 97% 29.65 kg/m2 Former Smoker Vitals History BMI and BSA Data Body Mass Index Body Surface Area  
 29.65 kg/m 2 2.06 m 2 Preferred Pharmacy Pharmacy Name Phone [facility] [address] [place] [address] [phone] Your Updated Medication List  
  
   
This list is accurate as of: 11/14/17  3:03 PM.  Always use your most recent med list.  
  
  
  
  
 calcitRIOL 0.5 mcg capsule Commonly known as:  ROCALTROL Take 0.5 mcg by mouth daily. calcium acetate 667 mg Cap Commonly known as:  PHOSLO Take  by mouth three (3) times daily (with meals). carvedilol 25 mg tablet Commonly known as:  Macel Gustavo Take 25 mg by mouth two (2) times daily (with meals). cholecalciferol 1,000 unit tablet Commonly known as:  VITAMIN D3 Take 1,000 Units by mouth daily. cloNIDine HCl 0.1 mg tablet Commonly known as:  CATAPRES  
 Take 0.2 mg by mouth two (2) times daily as needed. Indications: prn  or higher RENAL CAPS PO Take  by mouth. simvastatin 40 mg tablet Commonly known as:  ZOCOR  
TAKE ONE TABLET BY MOUTH NIGHTLY Patient Instructions TODAY, please go to: 
 Heart sheet CHECK OUT Please schedule the following appointments at CHECK OUT: 
· Cholesterol follow up with Dr. Braxton Morillo in 6 months · Lab visit, fasting the week before our visit. Today's Plan: Your cholesterol and other risk factors put you at increased risk of cardiovascular disease such as heart attack and stroke. I recommend intensifying your statin (cholesterol lowering medication) to decrease your risk of heart attack, stroke, and related illness. You are currently on simvastatin. I recommend changing to 40mg lipitor aka atorvastatin. 
 
 
 
_____________________ Shingles vaccine Please get a shingles vaccine at your pharmacy. This is also called [employer]. Ask the pharmacist to tell you what your copay will be. If you need to you can call your insurance company to ask more questions. After you get the vaccine, have the pharmacist fax in documentation to the office. Eye Exam 
Our records show that you are due for an annual eye exam. If you have diabetes, make sure your eye doctor knows so that they take a close look at the back of your eye (retina). If you have not had this done within the year, please schedule to see your eye doctor. After the visit, have your doctor fax us documentation to this office. [address] [facility] Braxton Morillo -[phone]. If you have already had this done, let us know so we can request it or have your doctor fax us documentation to this office. [address] [facility] Braxton Morillo -[phone]. Introducing [facility] & HEALTH SERVICES! Dear Vlad Harris: 
Thank you for requesting a [employer] account.   Our records indicate that you already have an active [employer] account. You can access your account anytime at [email or web address] Did you know that you can access your hospital and ER discharge instructions at any time in [employer]? You can also review all of your test results from your hospital stay or ER visit. Additional Information If you have questions, please visit the Frequently Asked Questions section of the [employer] website at [email or web address] Remember, [employer] is NOT to be used for urgent needs. For medical emergencies, dial 911. Now available from your iPhone and Android! Please provide this summary of care documentation to your next provider. Your primary care clinician is listed as Abelardo Dominguez. If you have any questions after today's visit, please call [phone].

## 2017-11-14 NOTE — PROGRESS NOTES
[address] [place] [address] Visit   11/14/2017  Patient ID: Raul Vazquez is a 69 y.o. male. Assessment/Plan:    Diagnoses and all orders for this visit:    1. Essential hypertension  BP at home, usually  At goal. No change today. 2. CKD (chronic kidney disease) stage 5, GFR less than 15 ml/min ([facility])  Continue with nephrology    3. Hyperlipidemia, unspecified hyperlipidemia type  Patient will consider lipitor. He will discuss with nephrology and let me know if if would like to change. Educated about food, physical activity, and lifestyle choices. Given handout on AHA, mediterranean and DASH diet recommendations. 4. Prediabetes  Stable    5. Kidney transplant candidate  Continue follow up with renal.    Counselled pt on Patient health concerns and plans. Patient was offered a choice/choices in the treatment plan today. Reviewed return precautions as appropriate. Patient expresses understanding of the plan and agrees with recommendations. See patient instructions for more. Patient Instructions   TODAY, please go to:   Heart sheet   CHECK OUT        Please schedule the following appointments at CHECK OUT:  · Cholesterol follow up with Dr. Flores Cancer in 6 months   · Lab visit, fasting the week before our visit. Today's Plan: Your cholesterol and other risk factors put you at increased risk of cardiovascular disease such as heart attack and stroke. I recommend intensifying your statin (cholesterol lowering medication) to decrease your risk of heart attack, stroke, and related illness. You are currently on simvastatin. I recommend changing to 40mg lipitor aka atorvastatin.        _____________________       Shingles vaccine  Please get a shingles vaccine at your pharmacy. This is also called SHINGRIX. Ask the pharmacist to tell you what your copay will be. If you need to you can call your insurance company to ask more questions.  After you get the vaccine, have the pharmacist fax in documentation to the office. Eye Exam  Our records show that you are due for an annual eye exam. If you have diabetes, make sure your eye doctor knows so that they take a close look at the back of your eye (retina). If you have not had this done within the year, please schedule to see your eye doctor. After the visit, have your doctor fax us documentation to this office. [address] -[phone]. If you have already had this done, let us know so we can request it or have your doctor fax us documentation to this office. [address] -[phone]. Subjective:   HPI:  Nalini Lerma is a 69 y.o. male being seen for:   Chief Complaint   Patient presents with    Cholesterol Problem     F/U    Results     Labs     Blood sugar problem     F/U     Lab review  · Labs reviewed in detail  · 10 year ascvd 26%  · Lifetime ascvd risk 19%    ·  approved for transplant list about 2 weeks ago. ·  lowest /??  · Highest 180-90/70-80  · May use clonidine less than monthly. · 4-6 hours after medication checks BP at usually at goal.   · Usually at home is 120-130s. Screening and Prevention Due:  Health Maintenance Due   Topic Date Due    ZOSTER VACCINE AGE 60>  08/23/2008    GLAUCOMA SCREENING Q2Y  10/23/2013         Review of Systems  Otherwise as noted in HPI  ?   Objective:     Visit Vitals    /74 (BP 1 Location: Right arm, BP Patient Position: Sitting)    Pulse 66    Temp 97.9 °F (36.6 °C) (Oral)    Resp 18    Ht 5' 8\" (1.727 m)    Wt 195 lb (88.5 kg)    SpO2 97%    BMI 29.65 kg/m2     Wt Readings from Last 3 Encounters:   11/14/17 195 lb (88.5 kg)   09/07/17 191 lb (86.6 kg)   05/27/17 187 lb (84.8 kg)     BP Readings from Last 3 Encounters:   11/14/17 158/74   09/07/17 (!) 168/98   05/27/17 148/73     PHQ over the last two weeks 11/14/2017   Little interest or pleasure in doing things Not at all   Feeling down, depressed or hopeless Not at all Total Score PHQ 2 0     Physical Exam   Constitutional: He appears well-developed and well-nourished. No distress. Pulmonary/Chest: Effort normal. No respiratory distress. Neurological: He is alert. Psychiatric: He has a normal mood and affect. His behavior is normal.       Allergies   Allergen Reactions    Amlodipine Swelling    Other Medication Other (comments)     Eye Drop 2002- Caused pain, redness and discomfort     Prior to Admission medications    Medication Sig Start Date End Date Taking? Authorizing Provider   calcium acetate (PHOSLO) 667 mg cap Take  by mouth three (3) times daily (with meals). Yes Historical Provider   carvedilol (COREG) 25 mg tablet Take 25 mg by mouth two (2) times daily (with meals). Yes Historical Provider   simvastatin (ZOCOR) 40 mg tablet TAKE ONE TABLET BY MOUTH NIGHTLY 7/14/17  Yes Karley Zamudio. Tequila Power MD   B COMPLEX W-C NO.20/FOLIC ACID (RENAL CAPS PO) Take  by mouth. Yes Phys MD Bon   calcitRIOL (ROCALTROL) 0.5 mcg capsule Take 0.5 mcg by mouth daily. Yes Phys MD Bon   cloNIDine HCl (CATAPRES) 0.1 mg tablet Take 0.2 mg by mouth two (2) times daily as needed. Indications: prn  or higher   Yes Historical Provider   cholecalciferol (VITAMIN D3) 1,000 unit tablet Take 1,000 Units by mouth daily.    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

## 2017-11-14 NOTE — PATIENT INSTRUCTIONS
TODAY, please go to:   Heart sheet   CHECK OUT        Please schedule the following appointments at CHECK OUT:  · Cholesterol follow up with Dr. Trell Garrido in 6 months   · Lab visit, fasting the week before our visit. Today's Plan: Your cholesterol and other risk factors put you at increased risk of cardiovascular disease such as heart attack and stroke. I recommend intensifying your statin (cholesterol lowering medication) to decrease your risk of heart attack, stroke, and related illness. You are currently on simvastatin. I recommend changing to 40mg lipitor aka atorvastatin.        _____________________       Shingles vaccine  Please get a shingles vaccine at your pharmacy. This is also called [employer] [place]. Ask the pharmacist to tell you what your copay will be. If you need to you can call your insurance company to ask more questions. After you get the vaccine, have the pharmacist fax in documentation to the office. Eye Exam  Our records show that you are due for an annual eye exam. If you have diabetes, make sure your eye doctor knows so that they take a close look at the back of your eye (retina). If you have not had this done within the year, please schedule to see your eye doctor. After the visit, have your doctor fax us documentation to this office. [address] -[phone]. If you have already had this done, let us know so we can request it or have your doctor fax us documentation to this office. [address] -[phone].

## 2018-02-01 ENCOUNTER — OFFICE VISIT (OUTPATIENT)
Dept: FAMILY MEDICINE CLINIC | Age: 68
End: 2018-02-01

## 2018-02-01 VITALS
BODY MASS INDEX: 28.22 KG/M2 | WEIGHT: 186.2 LBS | OXYGEN SATURATION: 98 % | DIASTOLIC BLOOD PRESSURE: 89 MMHG | SYSTOLIC BLOOD PRESSURE: 155 MMHG | RESPIRATION RATE: 19 BRPM | TEMPERATURE: 97.5 F | HEIGHT: 68 IN | HEART RATE: 65 BPM

## 2018-02-01 DIAGNOSIS — M79.89 RIGHT LEG SWELLING: Primary | ICD-10-CM
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
## 2018-08-06 RX ORDER — SIMVASTATIN 40 MG/1
TABLET, FILM COATED ORAL
Qty: 90 TAB | Refills: 1 | Status: SHIPPED | OUTPATIENT
Start: 2018-08-06 | End: 2019-04-05 | Stop reason: SDUPTHER

## 2018-08-06 NOTE — TELEPHONE ENCOUNTER
PCP: Parul Walden. Jamie Marr MD    Last appt: 2/1/2018  No future appointments.     Requested Prescriptions     Pending Prescriptions Disp Refills    simvastatin (ZOCOR) 40 mg tablet [Pharmacy Med Name: SIMVASTATIN 40MG    TAB] 90 Tab 3     Sig: TAKE ONE TABLET BY MOUTH NIGHTLY       Prior labs and Blood pressures:  BP Readings from Last 3 Encounters:   02/01/18 155/89   11/14/17 158/74   09/07/17 (!) 168/98     Lab Results   Component Value Date/Time    Sodium 142 11/07/2017 02:06 PM    Potassium 5.7 (H) 11/07/2017 02:06 PM    Chloride 102 11/07/2017 02:06 PM    CO2 22 11/07/2017 02:06 PM    Glucose 76 11/07/2017 02:06 PM    BUN 64 (H) 11/07/2017 02:06 PM    Creatinine 7.94 (H) 11/07/2017 02:06 PM    BUN/Creatinine ratio 8 (L) 11/07/2017 02:06 PM    GFR est AA 7 (L) 11/07/2017 02:06 PM    GFR est non-AA 6 (L) 11/07/2017 02:06 PM    Calcium 8.1 (L) 11/07/2017 02:06 PM     Lab Results   Component Value Date/Time    Hemoglobin A1c 6.0 (H) 11/07/2017 02:06 PM     Lab Results   Component Value Date/Time    Cholesterol, total 155 11/07/2017 02:06 PM    HDL Cholesterol 39 (L) 11/07/2017 02:06 PM    LDL, calculated 99 11/07/2017 02:06 PM    VLDL, calculated 17 11/07/2017 02:06 PM    Triglyceride 86 11/07/2017 02:06 PM     Lab Results   Component Value Date/Time    VITAMIN D, 25-HYDROXY 25.0 (L) 11/07/2017 02:06 PM       No results found for: TSH, TSH2, TSH3, TSHP, TSHEXT

## 2018-08-16 ENCOUNTER — OFFICE VISIT (OUTPATIENT)
Dept: FAMILY MEDICINE CLINIC | Age: 68
End: 2018-08-16

## 2018-08-16 VITALS
DIASTOLIC BLOOD PRESSURE: 90 MMHG | OXYGEN SATURATION: 97 % | TEMPERATURE: 96.9 F | BODY MASS INDEX: 28.58 KG/M2 | HEART RATE: 65 BPM | RESPIRATION RATE: 18 BRPM | HEIGHT: 68 IN | SYSTOLIC BLOOD PRESSURE: 168 MMHG | WEIGHT: 188.6 LBS

## 2018-08-16 DIAGNOSIS — Z99.2 ANEMIA IN CHRONIC KIDNEY DISEASE, ON CHRONIC DIALYSIS (HCC): ICD-10-CM

## 2018-08-16 DIAGNOSIS — E78.5 HYPERLIPIDEMIA, UNSPECIFIED HYPERLIPIDEMIA TYPE: ICD-10-CM

## 2018-08-16 DIAGNOSIS — M17.0 PRIMARY OSTEOARTHRITIS OF BOTH KNEES: ICD-10-CM

## 2018-08-16 DIAGNOSIS — I10 ELEVATED BLOOD PRESSURE READING IN OFFICE WITH DIAGNOSIS OF HYPERTENSION: ICD-10-CM

## 2018-08-16 DIAGNOSIS — I10 ESSENTIAL HYPERTENSION: Primary | ICD-10-CM

## 2018-08-16 DIAGNOSIS — D63.1 ANEMIA IN CHRONIC KIDNEY DISEASE, ON CHRONIC DIALYSIS (HCC): ICD-10-CM

## 2018-08-16 DIAGNOSIS — N18.6 ANEMIA IN CHRONIC KIDNEY DISEASE, ON CHRONIC DIALYSIS (HCC): ICD-10-CM

## 2018-08-16 DIAGNOSIS — N18.5 CKD (CHRONIC KIDNEY DISEASE) STAGE 5, GFR LESS THAN 15 ML/MIN (HCC): ICD-10-CM

## 2018-08-16 DIAGNOSIS — C61 PROSTATE CANCER (HCC): ICD-10-CM

## 2018-08-16 DIAGNOSIS — Z71.89 ADVANCED DIRECTIVES, COUNSELING/DISCUSSION: ICD-10-CM

## 2018-08-16 RX ORDER — CARVEDILOL 25 MG/1
25 TABLET ORAL
COMMUNITY
End: 2018-08-16 | Stop reason: SDUPTHER

## 2018-08-16 RX ORDER — SIMVASTATIN 40 MG/1
TABLET, FILM COATED ORAL
COMMUNITY
Start: 2017-07-14 | End: 2018-08-16 | Stop reason: SDUPTHER

## 2018-08-16 RX ORDER — CLONIDINE 0.1 MG/24H
PATCH, EXTENDED RELEASE TRANSDERMAL
COMMUNITY
Start: 2018-08-02 | End: 2019-04-29

## 2018-08-16 NOTE — PROGRESS NOTES
Alex Ramachandran  Identified pt with two pt identifiers(name and ). Chief Complaint   Patient presents with   [address]     rm 1/non fasting/       1. Have you been to the ER, urgent care clinic since your last visit?  no Hospitalized since your last visit? No    2. Have you seen or consulted any other health care providers outside of the [facility] since your last visit? Include any pap smears or colon screening. No    Today's provider has been notified of reason for visit, vitals and flowsheets obtained on patients.        Reviewed record In preparation for visit, huddled with provider and have obtained necessary documentation      Health Maintenance Due   Topic    ZOSTER VACCINE AGE 60>        Wt Readings from Last 3 Encounters:   18 188 lb 9.6 oz (85.5 kg)   18 186 lb 3.2 oz (84.5 kg)   17 195 lb (88.5 kg)     Temp Readings from Last 3 Encounters:   18 96.9 °F (36.1 °C) (Oral)   18 97.5 °F (36.4 °C) (Oral)   17 97.9 °F (36.6 °C) (Oral)     BP Readings from Last 3 Encounters:   18 (!) 164/95   18 155/89   17 158/74     Pulse Readings from Last 3 Encounters:   18 65   18 65   17 66     Vitals:    18 1517   BP: (!) 164/95   Pulse: 65   Resp: 18   Temp: 96.9 °F (36.1 °C)   TempSrc: Oral   SpO2: 97%   Weight: 188 lb 9.6 oz (85.5 kg)   Height: 5' 8\" (1.727 m)   PainSc:   0 - No pain         Learning Assessment:  :     Learning Assessment 2014   PRIMARY LEARNER Patient   HIGHEST LEVEL OF EDUCATION - PRIMARY LEARNER  4 YEARS OF COLLEGE   BARRIERS PRIMARY LEARNER NONE   CO-LEARNER CAREGIVER No   PRIMARY LANGUAGE ENGLISH   LEARNER PREFERENCE PRIMARY DEMONSTRATION     VIDEOS     READING   ANSWERED BY Patient   RELATIONSHIP SELF       Depression Screening:  :     PHQ over the last two weeks 2018   Little interest or pleasure in doing things Not at all   Feeling down, depressed, irritable, or hopeless Not at all   Total Score PHQ 2 0       Fall Risk Assessment:  :     Fall Risk Assessment, last 12 mths 8/16/2018   Able to walk? Yes   Fall in past 12 months? No       Abuse Screening:  :     Abuse Screening Questionnaire 8/16/2018 9/7/2017   Do you ever feel afraid of your partner? N N   Are you in a relationship with someone who physically or mentally threatens you? N N   Is it safe for you to go home? Y Y       ADL Screening:  :     ADL Assessment 8/16/2018   Feeding yourself No Help Needed   Getting from bed to chair No Help Needed   Getting dressed No Help Needed   Bathing or showering No Help Needed   Walk across the room (includes cane/walker) No Help Needed   Using the telphone No Help Needed   Taking your medications No Help Needed   Preparing meals No Help Needed   Managing money (expenses/bills) No Help Needed   Moderately strenuous housework (laundry) No Help Needed   Shopping for personal items (toiletries/medicines) No Help Needed   Shopping for groceries No Help Needed   Driving No Help Needed   Climbing a flight of stairs No Help Needed   Getting to places beyond walking distances No Help Needed                 Medication reconciliation up to date and corrected with patient at this time.

## 2018-08-16 NOTE — PROGRESS NOTES
Does annual PE with transplant center past April. Sent to urol at [facility]. Did RODRÍGUEZ. Bx mid July. Results notified July 31 of cancer. 4/12 samples positive. Discussed options. Chinese Other had appt to discuss removal.  Plans to do robotic surgery at [facility]. Meets surgeon mid Sept.  On transplant list.  On dialisys since April '17. Will get cystoscopy for f/u of hematuria prior to surgery. Last saw neph end June. Put on clonidine patch. Sees again by end of month. Not working on diet nor exercise at present. Visit Vitals    /90 (BP 1 Location: Left arm, BP Patient Position: Sitting)    Pulse 65    Temp 96.9 °F (36.1 °C) (Oral)    Resp 18    Ht 5' 8\" (1.727 m)    Wt 188 lb 9.6 oz (85.5 kg)    SpO2 97%    BMI 28.68 kg/m2       Patient alert and cooperative. Reviewed above. Assessment:  1. Chronic kidney disease, on chronic dialysis, awaiting on transplant list.  2. HTN with fluctuating readings, followed and treated by the nephrologist.  3. Hyperlipids,on statin. 4. Recent diagnosis of prostate cancer, planning removal.  5. Anemia of chronic disease. 6. Osteoarthritis of the knees. Plan:  1. Return in [place] for annual labs, wellness. Follow otherwise here prn.  2. Continue follow up with several specialists.

## 2018-08-16 NOTE — ASSESSMENT & PLAN NOTE
This condition is managed by Specialist.   Key CKD Meds             ferric citrate (AURYXIA) 210 mg iron tablet  (Taking) Take  by mouth three (3) times daily (with meals). calcium acetate (PHOSLO) 667 mg cap  (Taking) Take  by mouth three (3) times daily (with meals). cholecalciferol (VITAMIN D3) 1,000 unit tablet  (Taking) Take 1,000 Units by mouth daily. Lab Results   Component Value Date/Time    GFR est non-AA 6 11/07/2017 02:06 PM    GFRNA, POC 7 03/16/2017 10:53 AM    GFR est AA 7 11/07/2017 02:06 PM    GFRAA, POC 8 03/16/2017 10:53 AM    Creatinine 7.94 11/07/2017 02:06 PM    Creatinine (POC) 7.8 03/16/2017 10:53 AM    BUN 64 11/07/2017 02:06 PM    BUN (POC) 69 03/16/2017 10:53 AM    Sodium 142 11/07/2017 02:06 PM    Sodium (POC) 139 03/16/2017 10:53 AM    Potassium 5.7 11/07/2017 02:06 PM    Potassium (POC) 5.5 03/16/2017 10:53 AM    Chloride 102 11/07/2017 02:06 PM    Chloride (POC) 106 03/16/2017 10:53 AM    CO2 22 11/07/2017 02:06 PM    Calcium 8.1 11/07/2017 02:06 PM    VITAMIN D, 25-HYDROXY 25.0 11/07/2017 02:06 PM     CrCl cannot be calculated (Patient's most recent sCr result is older than the maximum 180 days allowed. ).

## 2018-08-16 NOTE — PATIENT INSTRUCTIONS
Medicare Wellness Visit, Male    The best way to live healthy is to have a lifestyle where you eat a well-balanced diet, exercise regularly, limit alcohol use, and quit all forms of tobacco/nicotine, if applicable. Regular preventive services are another way to keep healthy. Preventive services (vaccines, screening tests, monitoring & exams) can help personalize your care plan, which helps you manage your own care. Screening tests can find health problems at the earliest stages, when they are easiest to treat. [address] [facility] follows the current, evidence-based guidelines published by the [facility] Rao Keagan ([facility]) when recommending preventive services for our patients. Because we follow these guidelines, sometimes recommendations change over time as research supports it. (For example, a prostate screening blood test is no longer routinely recommended for men with no symptoms.)    Of course, you and your provider may decide to screen more often for some diseases, based on your risk and co-morbidities (chronic disease you are already diagnosed with). Preventive services for you include:    - Medicare offers their members a free annual wellness visit, which is time for you and your primary care provider to discuss and plan for your preventive service needs. Take advantage of this benefit every year!    -All people over age 72 should receive the recommended pneumonia vaccines. Current USPSTF guidelines recommend a series of two vaccines for the best pneumonia protection.     -All adults should have a yearly flu vaccine and a tetanus vaccine every 10 years.  All adults age 61 years should receive a shingles vaccine once in their lifetime.      -All adults age 38-68 years who are overweight should have a diabetes screening test once every three years.     -Other screening tests & preventive services for persons with diabetes include: an eye exam to screen for diabetic retinopathy, a kidney function test, a foot exam, and stricter control over your cholesterol.     -Cardiovascular screening for adults with routine risk involves an electrocardiogram (ECG) at intervals determined by the provider.     -Colorectal cancer screenings should be done for adults age 54-65 years with normal risk. There are a number of acceptable methods of screening for this type of cancer. Each test has its own benefits and drawbacks. Discuss with your provider what is most appropriate for you during your annual wellness visit. The different tests include: colonoscopy (considered the best screening method), a fecal occult blood test, a fecal DNA test, and sigmoidoscopy.    -All adults born between [facility] should be screened once for Hepatitis C.    -An Abdominal Aortic Aneurysm (AAA) Screening is recommended for men age 73-68 who has ever smoked in their lifetime.      Here is a list of your current Health Maintenance items (your personalized list of preventive services) with a due date:  Health Maintenance Due   Topic Date Due    Shingles Vaccine  08/23/2008

## 2018-08-16 NOTE — ACP (ADVANCE CARE PLANNING)
Advance Care Planning    Advance Care Planning (ACP) Provider Conversation Snapshot    Date of ACP Conversation: 08/16/18  Persons included in Conversation:  patient  Length of ACP Conversation in minutes:  <16 minutes (Non-Billable)    Authorized Decision Maker (if patient is incapable of making informed decisions): This person is:    Other Legally Authorized Decision Maker (e.g. Next of Kin)  Wife        For Patients with Decision Making Capacity:   Values/Goals: Exploration of values, goals, and preferences if recovery is not expected, even with continued medical treatment in the event of:  Imminent death  Severe, permanent brain injury    Conversation Outcomes / Follow-Up Plan:   Recommended completion of Advance Directive form after review of ACP materials and conversation with prospective healthcare agent

## 2018-08-16 NOTE — ASSESSMENT & PLAN NOTE
This condition is managed by Specialist.  Key Oncology Meds     Patient is on no Oncologic meds. Key Pain Meds     The patient is on no pain meds.         Lab Results   Component Value Date/Time    WBC 9.7 11/07/2017 02:06 PM    HGB 9.8 11/07/2017 02:06 PM    Hemoglobin (POC) 12.2 03/16/2017 10:53 AM    HCT 31.0 11/07/2017 02:06 PM    Hematocrit (POC) 36 03/16/2017 10:53 AM    PLATELET 523 16/50/9642 02:06 PM    Creatinine 7.94 11/07/2017 02:06 PM    Creatinine (POC) 7.8 03/16/2017 10:53 AM    BUN 64 11/07/2017 02:06 PM    BUN (POC) 69 03/16/2017 10:53 AM    ALT (SGPT) 21 11/07/2017 02:06 PM    AST (SGOT) 17 11/07/2017 02:06 PM    Albumin 3.4 11/07/2017 02:06 PM

## 2019-02-17 NOTE — MR AVS SNAPSHOT
[address] [place] [address] 
[address] [place] Gonzalez [address] 
[phone] Patient: Bernard Blanton MRN: [id number] :1950 Visit Information Date & Time Provider Department Dept. Phone Encounter #  
 2018  3:00 PM Nitin Perez MD [facility] [phone] [id number] Upcoming Health Maintenance Date Due ZOSTER VACCINE AGE 60> 2008 Influenza Age 5 to Adult 2018* MEDICARE YEARLY EXAM 2018 GLAUCOMA SCREENING Q2Y 2020 COLONOSCOPY 3/16/2020 DTaP/Tdap/Td series (2 - Td) 2023 *Topic was postponed. The date shown is not the original due date. Allergies as of 2018  Review Complete On: 2018 By: Jasvir Kelly Severity Noted Reaction Type Reactions Amlodipine  2013    Swelling Other Medication  2013    Other (comments) Eye Drop 2002- Caused pain, redness and discomfort Current Immunizations  Reviewed on 2018 Name Date Tdap 2013 Not reviewed this visit You Were Diagnosed With   
  
 Codes Comments Essential hypertension    -  Primary ICD-10-CM: I10 
ICD-9-CM: 401.9 CKD (chronic kidney disease) stage 5, GFR less than 15 ml/min (HCC)     ICD-10-CM: N18.5 ICD-9-CM: 585.5 Prostate cancer [facility])     ICD-10-CM: H66 ICD-9-CM: 511 Elevated blood pressure reading in office with diagnosis of hypertension     ICD-10-CM: I10 
ICD-9-CM: 401.9 Anemia in chronic kidney disease, on chronic dialysis (HCC)     ICD-10-CM: N18.6, D63.1, Z99.2 ICD-9-CM: 285.21, 585.6, V45.11 Primary osteoarthritis of both knees     ICD-10-CM: M17.0 ICD-9-CM: 715.16 Advanced directives, counseling/discussion     ICD-10-CM: Z71.89 ICD-9-CM: V65.49 Vitals  BP Pulse Temp Resp Height(growth percentile) Weight(growth percentile)  
 168/90 (BP 1 Location: Left arm, BP Patient Position: Sitting) 65 96.9 °F (36.1 °C) (Oral) 18 5' 8\" (1.727 m) 188 lb 9.6 oz (85.5 kg) SpO2 BMI Smoking Status 97% 28.68 kg/m2 Former Smoker Vitals History BMI and BSA Data Body Mass Index Body Surface Area  
 28.68 kg/m 2 2.03 m 2 Preferred Pharmacy Pharmacy Name Phone [address] [place] [address] [phone] Your Updated Medication List  
  
   
This list is accurate as of 8/16/18  3:46 PM.  Always use your most recent med list.  
  
  
  
  
 AURYXIA 210 mg iron tablet Generic drug:  ferric citrate Take  by mouth three (3) times daily (with meals). calcium acetate 667 mg Cap Commonly known as:  PHOSLO Take  by mouth three (3) times daily (with meals). carvedilol 25 mg tablet Commonly known as:  Sharolyn Gordillo Take 25 mg by mouth two (2) times daily (with meals). cholecalciferol 1,000 unit tablet Commonly known as:  VITAMIN D3 Take 1,000 Units by mouth daily. cloNIDine 0.1 mg/24 hr patch Commonly known as:  CATAPRES  
  
 RENAL CAPS PO Take  by mouth. simvastatin 40 mg tablet Commonly known as:  ZOCOR  
TAKE ONE TABLET BY MOUTH NIGHTLY Patient Instructions Medicare Wellness Visit, Male The best way to live healthy is to have a lifestyle where you eat a well-balanced diet, exercise regularly, limit alcohol use, and quit all forms of tobacco/nicotine, if applicable. Regular preventive services are another way to keep healthy. Preventive services (vaccines, screening tests, monitoring & exams) can help personalize your care plan, which helps you manage your own care. Screening tests can find health problems at the earliest stages, when they are easiest to treat. [facility] follows the current, evidence-based guidelines published by the [facility] Rao Keagan (USPSTF) when recommending preventive services for our patients.  Because we follow Event Note/Hypotension these guidelines, sometimes recommendations change over time as research supports it. (For example, a prostate screening blood test is no longer routinely recommended for men with no symptoms.) Of course, you and your provider may decide to screen more often for some diseases, based on your risk and co-morbidities (chronic disease you are already diagnosed with). Preventive services for you include: - Medicare offers their members a free annual wellness visit, which is time for you and your primary care provider to discuss and plan for your preventive service needs. Take advantage of this benefit every year! 
 
-All people over age 72 should receive the recommended pneumonia vaccines. Current USPSTF guidelines recommend a series of two vaccines for the best pneumonia protection.  
 
-All adults should have a yearly flu vaccine and a tetanus vaccine every 10 years. All adults age 61 years should receive a shingles vaccine once in their lifetime.   
 
-All adults age 38-68 years who are overweight should have a diabetes screening test once every three years.  
 
-Other screening tests & preventive services for persons with diabetes include: an eye exam to screen for diabetic retinopathy, a kidney function test, a foot exam, and stricter control over your cholesterol.  
 
-Cardiovascular screening for adults with routine risk involves an electrocardiogram (ECG) at intervals determined by the provider.  
 
-Colorectal cancer screenings should be done for adults age 54-65 years with normal risk. There are a number of acceptable methods of screening for this type of cancer. Each test has its own benefits and drawbacks. Discuss with your provider what is most appropriate for you during your annual wellness visit. The different tests include: colonoscopy (considered the best screening method), a fecal occult blood test, a fecal DNA test, and sigmoidoscopy. -All adults born between [birth year] and 1967 should be screened once for Hepatitis C. 
 
-An Abdominal Aortic Aneurysm (AAA) Screening is recommended for men age 73-68 who has ever smoked in their lifetime. Here is a list of your current Health Maintenance items (your personalized list of preventive services) with a due date: 
Health Maintenance Due Topic Date Due  Shingles Vaccine  08/23/2008 Introducing [facility]! Dear Fariba Olivares: 
Thank you for requesting a [employer] account. Our records indicate that you already have an active [employer] account. You can access your account anytime at [email or web address] Did you know that you can access your hospital and ER discharge instructions at any time in [employer]? You can also review all of your test results from your hospital stay or ER visit. Additional Information If you have questions, please visit the Frequently Asked Questions section of the [employer] website at [email or web address] Remember, [employer] is NOT to be used for urgent needs. For medical emergencies, dial 911. Now available from your iPhone and Android! Please provide this summary of care documentation to your next provider. Your primary care clinician is listed as Mark Coffey. If you have any questions after today's visit, please call [phone].

## 2019-04-05 NOTE — TELEPHONE ENCOUNTER
Requested Prescriptions     Pending Prescriptions Disp Refills    simvastatin (ZOCOR) 40 mg tablet 90 Tab 1

## 2019-04-05 NOTE — TELEPHONE ENCOUNTER
PCP: Marina Bradford MD    Last appt: 8/16/2018  No future appointments. Requested Prescriptions     Pending Prescriptions Disp Refills    simvastatin (ZOCOR) 40 mg tablet 30 Tab 0     Sig: Take 1 Tab by mouth nightly.        Prior labs and Blood pressures:  BP Readings from Last 3 Encounters:   08/16/18 168/90   02/01/18 155/89   11/14/17 158/74     Lab Results   Component Value Date/Time    Sodium 142 11/07/2017 02:06 PM    Potassium 5.7 (H) 11/07/2017 02:06 PM    Chloride 102 11/07/2017 02:06 PM    CO2 22 11/07/2017 02:06 PM    Glucose 76 11/07/2017 02:06 PM    BUN 64 (H) 11/07/2017 02:06 PM    Creatinine 7.94 (H) 11/07/2017 02:06 PM    BUN/Creatinine ratio 8 (L) 11/07/2017 02:06 PM    GFR est AA 7 (L) 11/07/2017 02:06 PM    GFR est non-AA 6 (L) 11/07/2017 02:06 PM    Calcium 8.1 (L) 11/07/2017 02:06 PM     Lab Results   Component Value Date/Time    Hemoglobin A1c 6.0 (H) 11/07/2017 02:06 PM     Lab Results   Component Value Date/Time    Cholesterol, total 155 11/07/2017 02:06 PM    HDL Cholesterol 39 (L) 11/07/2017 02:06 PM    LDL, calculated 99 11/07/2017 02:06 PM    VLDL, calculated 17 11/07/2017 02:06 PM    Triglyceride 86 11/07/2017 02:06 PM     Lab Results   Component Value Date/Time    VITAMIN D, 25-HYDROXY 25.0 (L) 11/07/2017 02:06 PM       No results found for: TSH, TSH2, TSH3, TSHP, TSHEXT

## 2019-04-10 RX ORDER — SIMVASTATIN 40 MG/1
40 TABLET, FILM COATED ORAL
Qty: 30 TAB | Refills: 0 | Status: SHIPPED | OUTPATIENT
Start: 2019-04-10

## 2019-04-22 LAB — CREATININE, EXTERNAL: 19

## 2019-04-29 ENCOUNTER — OFFICE VISIT (OUTPATIENT)
Dept: URGENT CARE | Age: 69
End: 2019-04-29

## 2019-04-29 VITALS
HEIGHT: 68 IN | TEMPERATURE: 98.3 F | OXYGEN SATURATION: 98 % | SYSTOLIC BLOOD PRESSURE: 182 MMHG | RESPIRATION RATE: 18 BRPM | DIASTOLIC BLOOD PRESSURE: 92 MMHG | HEART RATE: 74 BPM | WEIGHT: 191 LBS | BODY MASS INDEX: 28.95 KG/M2

## 2019-04-29 DIAGNOSIS — K04.7 DENTAL INFECTION: Primary | ICD-10-CM

## 2019-04-29 RX ORDER — AMOXICILLIN 500 MG/1
500 CAPSULE ORAL 3 TIMES DAILY
Qty: 30 CAP | Refills: 0 | Status: SHIPPED | OUTPATIENT
Start: 2019-04-29 | End: 2019-05-09

## 2019-04-29 NOTE — PROGRESS NOTES
76 yo male presents to  with cc of dental pain x1 week. He now has additional ear and throat pain on the same side (right). Dental Pain    The history is provided by the patient. This is a new problem. The current episode started more than 1 week ago. The problem occurs constantly. The pain is located in the right lower mouth. There was no fever. He has tried acetaminophen for the symptoms. Sore Throat    Associated symptoms include ear pain. Past Medical History:   Diagnosis Date    Anemia in chronic kidney disease (CKD)     Arthritis     hands, knees, ankles- gout d/t CKD    Benign hypertensive renal disease 3/18/16    Dr. Keven Pinedo Chronic kidney disease, stage III (moderate) 03/18/2016    Dr. Breanne Sunshine PD since 2/2017 after having catheter placed    Cold sore     right side of mouth, rare    GERD (gastroesophageal reflux disease)     hiatal hernia.  Gout 2013    Hiatal hernia     Hyperlipidemia     Hypertension     MGUS (monoclonal gammopathy of unknown significance) 3/22/2016    Persistent proteinuria     with m-spike, not seen 5/2013    Prediabetes     Prostate cancer ([facility] [address] 07/31/2018    Proteinuria 3/18/16    Dr. Keven Pinedo Renal failure     Right knee pain 4/13/16    Dr. Edilma Mills    Vitamin D deficiency         Past Surgical History:   Procedure Laterality Date    ABDOMEN SURGERY PROC UNLISTED  02/24/2017    Laparoscopic insertion of peritoneal dialysis catheter    COLONOSCOPY N/A 3/16/2017    COLONOSCOPY performed by Dilcia Perez MD at [address] [place] [address]  3/16/2017         ENDOSCOPY, COLON, DIAGNOSTIC  \"94, '05    Normal - repeat in 2014-15    HX ENDOSCOPY  2006/7    d/t dysphagia. had dilation.      HX HEENT  1956    tonsillectomy (4 yrs old)    HX HERNIA REPAIR  1969    Right inguinal hernia repair         Family History   Problem Relation Age of Onset    Dementia Mother 80    Stroke Father 64        ?etiology    Hypertension Father     Sleep Apnea Son     No Known Problems Son     No Known Problems Maternal Grandmother     No Known Problems Maternal Grandfather     No Known Problems Paternal Grandmother     No Known Problems Paternal Grandfather         Social History     Socioeconomic History    Marital status:      Spouse name: Hitesh Lopez    Number of children: 2    Years of education: Not on file    Highest education level: Not on file   Occupational History    Occupation:      Comment: [employer]   Social Needs    Financial resource strain: Not on file    Food insecurity:     Worry: Not on file     Inability: Not on file   Portero needs:     Medical: Not on file     Non-medical: Not on file   Tobacco Use    Smoking status: Former Smoker     Packs/day: 0.50     Years: 3.00     Pack years: 1.50     Types: Cigarettes     Last attempt to quit: 1987     Years since quittin.3    Smokeless tobacco: Never Used   Substance and Sexual Activity    Alcohol use: No     Comment: used to have drinks a day for 10 years. stopped  around .  Drug use: No    Sexual activity: Yes     Partners: Female     Comment: monogamous with wife   Lifestyle    Physical activity:     Days per week: Not on file     Minutes per session: Not on file    Stress: Not on file   Relationships    Social connections:     Talks on phone: Not on file     Gets together: Not on file     Attends Sabianism service: Not on file     Active member of club or organization: Not on file     Attends meetings of clubs or organizations: Not on file     Relationship status: Not on file    Intimate partner violence:     Fear of current or ex partner: Not on file     Emotionally abused: Not on file     Physically abused: Not on file     Forced sexual activity: Not on file   Other Topics Concern    Not on file   Social History Narrative    Lives with wife.      Two adult sons live close ALLERGIES: Amlodipine and Other medication    Review of Systems   Constitutional: Negative. HENT: Positive for dental problem, ear pain and sore throat. Vitals:    04/29/19 1536   BP: (!) 227/101   Pulse: 74   Resp: 18   Temp: 98.3 °F (36.8 °C)   SpO2: 98%   Weight: 191 lb (86.6 kg)   Height: 5' 8\" (1.727 m)       Physical Exam   Constitutional: He appears well-developed and well-nourished. No distress. HENT:   Head: Normocephalic and atraumatic. Right Ear: Hearing and tympanic membrane normal.   Left Ear: Hearing normal. A middle ear effusion is present. Mouth/Throat: Abnormal dentition. Dental abscesses present. Skin: He is not diaphoretic. Vitals reviewed. MDM     Differential Diagnosis; Clinical Impression; Plan:     Dental Abscess  Orders Placed This Encounter      amoxicillin (AMOXIL) 500 mg capsule          Sig: Take 1 Cap by mouth three (3) times daily for 10 days. Dispense:  30 Cap          Refill:  0  -reviewed patient taking po pain medication for inflammatory control  -reviewed abx use  -reviewed need to follow up with dental provider     Hypertension  -patient took [facility] early [facility] in the [facility].  -final BP prior to discharge 182/92  -needs to follow up with PCP for further management  -currently asymptomatic and no s/s of hypertensive urgency.   -if patient's blood pressure remains extremely elevated he needs to go to ED for further management. The patients condition was discussed with the patient and they understand. The patient is to follow up with PCP. If signs and symptoms become worse the pt is to go to the ER. The patient is to take medications as prescribed.        Procedures

## 2019-04-29 NOTE — PATIENT INSTRUCTIONS
Abscessed Tooth: Care Instructions  Your Care Instructions    An abscessed tooth is a tooth that has a pocket of pus in the tissues around it. Pus forms when the body tries to fight an infection caused by bacteria. If the pus cannot drain, it forms an abscess. An abscessed tooth can cause red, swollen gums and throbbing pain, especially when you chew. You may have a bad taste in your mouth and a fever, and your jaw may swell. Damage to the tooth, untreated tooth decay, or gum disease can cause an abscessed tooth. An abscessed tooth needs to be treated by a dental professional right away. If it is not treated, the infection could spread to other parts of your body. Your dentist will give you antibiotics to stop the infection. He or she may make a hole in the tooth or cut open (bradley) the abscess inside your mouth so that the infection can drain, which should relieve your pain. You may need to have a root canal treatment, which tries to save your tooth by taking out the infected pulp and replacing it with a healing medicine and/or a filling. If these treatments do not work, your tooth may have to be removed. Follow-up care is a key part of your treatment and safety. Be sure to make and go to all appointments, and call your doctor if you are having problems. It's also a good idea to know your test results and keep a list of the medicines you take. How can you care for yourself at home? · Reduce pain and swelling in your face and jaw by putting ice or a cold pack on the outside of your cheek for 10 to 20 minutes at a time. Put a thin cloth between the ice and your skin. · Take pain medicines exactly as directed. ? If the doctor gave you a prescription medicine for pain, take it as prescribed. ? If you are not taking a prescription pain medicine, ask your doctor if you can take an over-the-counter medicine. · Take your antibiotics as directed. Do not stop taking them just because you feel better.  You need to take the full course of antibiotics. To prevent tooth abscess  · Brush and floss every day, and have regular dental checkups. · Eat a healthy diet, and avoid sugary foods and drinks. · Do not smoke, use e-cigarettes with nicotine, or use spit tobacco. Tobacco and nicotine slow your ability to heal. Tobacco also increases your risk for gum disease and cancer of the mouth and throat. If you need help quitting, talk to your doctor about stop-smoking programs and medicines. These can increase your chances of quitting for good. When should you call for help? Call 911 anytime you think you may need emergency care. For example, call if:    · You have trouble breathing.    Call your doctor now or seek immediate medical care if:    · You have new or worse symptoms of infection, such as:  ? Increased pain, swelling, warmth, or redness. ? Red streaks leading from the area. ? Pus draining from the area. ? A fever.    Watch closely for changes in your health, and be sure to contact your doctor if:    · You do not get better as expected. Where can you learn more? Go to http://emerson-adalgisa.info/. Enter W019 in the search box to learn more about \"Abscessed Tooth: Care Instructions. \"  Current as of: March 27, 2018  Content Version: 11.9  © 5886-6062 [employer]. Care instructions adapted under license by [employer] (which disclaims liability or warranty for this information). If you have questions about a medical condition or this instruction, always ask your healthcare professional. [place] [address] any warranty or liability for your use of this information. Acute High Blood Pressure: Care Instructions  Your Care Instructions    Acute high blood pressure is very high blood pressure. It's a serious problem. Very high blood pressure can damage your brain, heart, eyes, and kidneys. You may have been given medicines to lower your blood pressure.  You may have gotten them as pills or through a needle in one of your veins. This is called an IV. And maybe you were given other medicines too. These can be needed when high blood pressure causes other problems. To keep your blood pressure at a lower level, you may need to make healthy lifestyle changes. And you will probably need to take medicines. Be sure to follow up with your doctor about your blood pressure and what you can do about it. Follow-up care is a key part of your treatment and safety. Be sure to make and go to all appointments, and call your doctor if you are having problems. It's also a good idea to know your test results and keep a list of the medicines you take. How can you care for yourself at home? · See your doctor as often as he or she recommends. This is to make sure your blood pressure is under control. You will probably go at least 2 times a year. But it may be more often at first.  · Take your blood pressure medicine exactly as prescribed. You may take one or more types. They include diuretics, beta-blockers, ACE inhibitors, calcium channel blockers, and angiotensin II receptor blockers. Call your doctor if you think you are having a problem with your medicine. · If you take blood pressure medicine, talk to your doctor before you take decongestants or anti-inflammatory medicine, such as ibuprofen. These can raise blood pressure. · Learn how to check your blood pressure at home. Check it often. · Ask your doctor if it's okay to drink alcohol. · Talk to your doctor about lifestyle changes that can help blood pressure. These include being active and not smoking. When should you call for help? Call 911 anytime you think you may need emergency care. This may mean having symptoms that suggest that your blood pressure is causing a serious heart or blood vessel problem. Your blood pressure may be over 180/120.   For example, call 911 if:    · You have symptoms of a heart attack.  These may include:  ? Chest pain or pressure, or a strange feeling in the chest.  ? Sweating. ? Shortness of breath. ? Nausea or vomiting. ? Pain, pressure, or a strange feeling in the back, neck, jaw, or upper belly or in one or both shoulders or arms. ? Lightheadedness or sudden weakness. ? A fast or irregular heartbeat.     · You have symptoms of a stroke. These may include:  ? Sudden numbness, tingling, weakness, or loss of movement in your face, arm, or leg, especially on only one side of your body. ? Sudden vision changes. ? Sudden trouble speaking. ? Sudden confusion or trouble understanding simple statements. ? Sudden problems with walking or balance. ? A sudden, severe headache that is different from past headaches.     · You have severe back or belly pain.    Do not wait until your blood pressure comes down on its own. Get help right away.   Call your doctor now or seek immediate care if:    · Your blood pressure is much higher than normal (such as 180/120 or higher), but you don't have symptoms.     · You think high blood pressure is causing symptoms, such as:  ? Severe headache.  ? Blurry vision.    Watch closely for changes in your health, and be sure to contact your doctor if:    · Your blood pressure measures higher than your doctor recommends at least 2 times. That means the top number is higher or the bottom number is higher, or both.     · You think you may be having side effects from your blood pressure medicine. Where can you learn more? Go to http://emerson-adalgisa.info/. Enter S409 in the search box to learn more about \"Acute High Blood Pressure: Care Instructions. \"  Current as of: July 22, 2018  Content Version: 11.9  © 2428-4954 [employer]. Care instructions adapted under license by for; to (do) (which disclaims liability or warranty for this information).  If you have questions about a medical condition or this instruction, always ask your healthcare professional. [address] any warranty or liability for your use of this information.

## 2019-05-02 ENCOUNTER — OFFICE VISIT (OUTPATIENT)
Dept: FAMILY MEDICINE CLINIC | Age: 69
End: 2019-05-02

## 2019-05-02 VITALS
DIASTOLIC BLOOD PRESSURE: 84 MMHG | SYSTOLIC BLOOD PRESSURE: 140 MMHG | TEMPERATURE: 98.8 F | WEIGHT: 191.8 LBS | BODY MASS INDEX: 29.07 KG/M2 | RESPIRATION RATE: 18 BRPM | OXYGEN SATURATION: 100 % | HEART RATE: 73 BPM | HEIGHT: 68 IN

## 2019-05-02 DIAGNOSIS — R73.03 PREDIABETES: ICD-10-CM

## 2019-05-02 DIAGNOSIS — R73.09 INCREASED GLUCOSE LEVEL: ICD-10-CM

## 2019-05-02 DIAGNOSIS — Z00.00 MEDICARE ANNUAL WELLNESS VISIT, SUBSEQUENT: Primary | ICD-10-CM

## 2019-05-02 DIAGNOSIS — N18.6 ANEMIA IN CHRONIC KIDNEY DISEASE, ON CHRONIC DIALYSIS (HCC): ICD-10-CM

## 2019-05-02 DIAGNOSIS — Z99.2 PERITONEAL DIALYSIS STATUS (HCC): ICD-10-CM

## 2019-05-02 DIAGNOSIS — I10 ESSENTIAL HYPERTENSION: ICD-10-CM

## 2019-05-02 DIAGNOSIS — M1A.39X1 CHRONIC GOUT DUE TO RENAL IMPAIRMENT OF MULTIPLE SITES WITH TOPHUS: ICD-10-CM

## 2019-05-02 DIAGNOSIS — D63.1 ANEMIA IN CHRONIC KIDNEY DISEASE, ON CHRONIC DIALYSIS (HCC): ICD-10-CM

## 2019-05-02 DIAGNOSIS — C61 PROSTATE CANCER (HCC): ICD-10-CM

## 2019-05-02 DIAGNOSIS — Z99.2 ANEMIA IN CHRONIC KIDNEY DISEASE, ON CHRONIC DIALYSIS (HCC): ICD-10-CM

## 2019-05-02 DIAGNOSIS — R80.9 PROTEINURIA, UNSPECIFIED TYPE: ICD-10-CM

## 2019-05-02 DIAGNOSIS — N18.6 END STAGE RENAL DISEASE (HCC): ICD-10-CM

## 2019-05-02 DIAGNOSIS — D47.2 MGUS (MONOCLONAL GAMMOPATHY OF UNKNOWN SIGNIFICANCE): ICD-10-CM

## 2019-05-02 DIAGNOSIS — E55.9 VITAMIN D DEFICIENCY: ICD-10-CM

## 2019-05-02 DIAGNOSIS — E78.2 MIXED HYPERLIPIDEMIA: ICD-10-CM

## 2019-05-02 NOTE — PROGRESS NOTES
Alex Ramachandran  Identified pt with two pt identifiers(name and ). Chief Complaint Patient presents with [facility] Annual Wellness Visit Rm 2/ nonfasting 1. Have you been to the ER, urgent care clinic since your last visit? Yes, [address], HTN\ 2019 [facility] , Fainting. Hospitalized since your last visit? Yes, Dr. Christina Lux MD 10/2018 Prostectomy 2. Have you seen or consulted any other health care providers outside of the [address] [place] [address] since your last visit?  no 
 
Would you like to sign up for MyChart today, if you have not already done so? yes If not, would you like information on MyChart, and how to sign up at a later time? No 
 
Living Will - no Information - no Medication reconciliation up to date and corrected with patient at this time. Today's provider has been notified of reason for visit, vitals and flowsheets obtained on patients. Reviewed record in preparation for visit, huddled with provider and have obtained necessary documentation. Health Maintenance Due Topic  Shingrix Vaccine Age 50> (1 of 2)  AAA Screening 73-67 YO Male Smoking Patients  Pneumococcal 65+ years (1 of 2 - PCV13)  MEDICARE YEARLY EXAM   
 
 
Wt Readings from Last 3 Encounters:  
19 191 lb (86.6 kg) 18 188 lb 9.6 oz (85.5 kg) 18 186 lb 3.2 oz (84.5 kg) Temp Readings from Last 3 Encounters:  
19 98.3 °F (36.8 °C)  
18 96.9 °F (36.1 °C) (Oral) 18 97.5 °F (36.4 °C) (Oral) BP Readings from Last 3 Encounters:  
19 (!) 182/92  
18 168/90  
18 155/89 Pulse Readings from Last 3 Encounters:  
19 74  
18 65  
18 65 There were no vitals filed for this visit. Learning Assessment: 
:  
 
Learning Assessment 2014 PRIMARY LEARNER Patient HIGHEST LEVEL OF EDUCATION - PRIMARY LEARNER  4 YEARS OF COLLEGE  
BARRIERS PRIMARY LEARNER NONE CO-LEARNER CAREGIVER No  
PRIMARY LANGUAGE ENGLISH  
LEARNER PREFERENCE PRIMARY DEMONSTRATION  
  VIDEOS READING  
ANSWERED BY Patient RELATIONSHIP SELF Depression Screening: 
:  
 
3 most recent PHQ Screens 5/2/2019 Little interest or pleasure in doing things Not at all Feeling down, depressed, irritable, or hopeless Not at all Total Score PHQ 2 0 Fall Risk Assessment: 
:  
 
Fall Risk Assessment, last 12 mths 8/16/2018 Able to walk? Yes Fall in past 12 months? No  
 
 
Abuse Screening: 
:  
 
Abuse Screening Questionnaire 8/16/2018 9/7/2017 Do you ever feel afraid of your partner? Randol Asters Are you in a relationship with someone who physically or mentally threatens you? Randol Asters Is it safe for you to go home? Y Y  
 
 
ADL Screening: 
:  
 

## 2019-05-02 NOTE — PROGRESS NOTES
This is the Subsequent Medicare Annual Wellness Exam, performed 12 months or more after the Initial AWV or the last Subsequent AWV I have reviewed the patient's medical history in detail and updated the computerized patient record. Eye doctor 2 yrs. Dentist 2 yrs. Colonoscopy '17.  5 yr repeat for polyps. Neph monthly. PD daily at home. Neph nurse alt monthly. Dx prostate cancer. Prostate removed past October. Will stay off transplant list for 2 yrs. To f/u with urol annually. ER for high BP Jan/ Feb.  No cause. Syncopal episode with nausea past Easter Sunday. No cause found. No other signif hx past yr. History Past Medical History:  
Diagnosis Date  Anemia in chronic kidney disease (CKD)  Arthritis   
 hands, knees, ankles- gout d/t CKD  Benign hypertensive renal disease 3/18/16 Dr. Elly Roldan  Chronic kidney disease, stage III (moderate) ([facility] [address] 03/18/2016 Dr. Jabari Conde/ PD since 2/2017 after having catheter placed  Cold sore   
 right side of mouth, rare  GERD (gastroesophageal reflux disease)   
 hiatal hernia.  Gout 2013  Hiatal hernia  Hyperlipidemia  Hypertension  MGUS (monoclonal gammopathy of unknown significance) 3/22/2016  Persistent proteinuria   
 with m-spike, not seen 5/2013  Prediabetes  Prostate cancer ([facility] [address] 07/31/2018  Proteinuria 3/18/16 Dr. Elly Roldan  Renal failure  Right knee pain 4/13/16 Dr. Teja Faulkner  Vitamin D deficiency Past Surgical History:  
Procedure Laterality Date  ABDOMEN SURGERY PROC UNLISTED  02/24/2017 Laparoscopic insertion of peritoneal dialysis catheter  COLONOSCOPY N/A 3/16/2017 COLONOSCOPY performed by Kami [facility] MD at [facility] ENDOSCOPY  COLONOSCOPY,REMV LESN,SNARE  3/16/2017  ENDOSCOPY, COLON, DIAGNOSTIC  \"94, '05 Normal - repeat in 2014-15  HX ENDOSCOPY  2006/7  
 d/t dysphagia. had dilation. [facility] [address] tons[facility]omy (4 yrs old) [address] Right inguinal hernia repair Current Outpatient Medications Medication Sig Dispense Refill  amoxicillin (AMOXIL) 500 mg capsule Take 1 Cap by mouth three (3) times daily for 10 days. 30 Cap 0  
 ferric citrate (AURYXIA) 210 mg iron tablet Take  by mouth three (3) times daily (with meals).  carvedilol (COREG) 25 mg tablet Take 25 mg by mouth two (2) times daily (with meals).  B COMPLEX W-C NO.20/FOLIC ACID (RENAL CAPS PO) Take  by mouth.  simvastatin (ZOCOR) 40 mg tablet Take 1 Tab by mouth nightly. 30 Tab 0  
 calcium acetate (PHOSLO) 667 mg cap Take  by mouth three (3) times daily (with meals). Allergies Allergen Reactions  Amlodipine Swelling  Other Medication Other (comments) Eye Drop - Caused pain, redness and discomfort Family History Problem Relation Age of Onset  Dementia Mother 80  Stroke Father 64  
     ?etiology  Hypertension Father  Sleep Apnea Son  No Known Problems Son  No Known Problems Maternal Grandmother  No Known Problems Maternal Grandfather  No Known Problems Paternal Grandmother  No Known Problems Paternal Grandfather Social History Tobacco Use  Smoking status: Former Smoker Packs/day: 0.50 Years: 3.00 Pack years: 1.50 Types: Cigarettes Last attempt to quit: 1987 Years since quittin.3  Smokeless tobacco: Never Used Substance Use Topics  Alcohol use: No  
  Comment: used to have drinks a day for 10 years. stopped  around . Patient Active Problem List  
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  
 MGUS (monoclonal gammopathy of unknown significance) D47.2  Primary osteoarthritis of both knees M17.0  CKD (chronic kidney disease) stage 5, GFR less than 15 ml/min ([facility]) N18.5  Anemia in chronic kidney disease (CKD) N18.9, D63.1  Hypertension I10  Vitamin D deficiency E55.9  Prostate cancer ([facility] [address] C61 Depression Risk Factor Screening:  
 
3 most recent PHQ Screens 5/2/2019 Little interest or pleasure in doing things Not at all Feeling down, depressed, irritable, or hopeless Not at all Total Score PHQ 2 0 Alcohol Risk Factor Screening: You do not drink alcohol or very rarely. Functional Ability and Level of Safety:  
Hearing Loss Hearing is good. Activities of Daily Living The home contains: handrails and rugs Patient does total self care Fall Risk Fall Risk Assessment, last 12 mths 8/16/2018 Able to walk? Yes Fall in past 12 months? No  
 
 
Abuse Screen Patient is not abused Cognitive Screening Evaluation of Cognitive Function: 
Has your family/caregiver stated any concerns about your memory: no 
Normal 
 
Patient Care Team  
Patient Care Team: 
Flash Freeman MD as PCP - General (Family Practice) Yun Dutta MD as Physician (Nephrology) Chris Quick MD as Physician (Orthopedic Surgery) eFlix Thakur MD as Physician (Sleep Medicine) Ginger Yuan MD as Surgeon (General Surgery) Assessment/Plan Education and counseling provided: 
Are appropriate based on today's review and evaluation End-of-Life planning (with patient's consent) Colorectal cancer screening tests Cardiovascular screening blood test 
Screening for glaucoma Diabetes screening test 
 
Diagnoses and all orders for this visit: 
 
1. Medicare annual wellness visit, subsequent 2. End stage renal disease (HCC) 
-     CBC WITH AUTOMATED DIFF 
-     URINALYSIS W/ RFLX MICROSCOPIC 
-     METABOLIC PANEL, COMPREHENSIVE 
-     URIC ACID 3. Peritoneal dialysis status ([facility] [address] 
-     CBC WITH AUTOMATED DIFF 
-     URINALYSIS W/ RFLX MICROSCOPIC 
-     METABOLIC PANEL, COMPREHENSIVE 
-     URIC ACID 4. Vitamin D deficiency -     VITAMIN D, 25 HYDROXY 5. Prediabetes -     METABOLIC PANEL, COMPREHENSIVE 
-     HEMOGLOBIN A1C WITH EAG 6. Essential hypertension 
-     CBC WITH AUTOMATED DIFF 
-     URINALYSIS W/ RFLX MICROSCOPIC 
-     TSH 3RD GENERATION 
-     METABOLIC PANEL, COMPREHENSIVE 
-     LIPID PANEL 7. Mixed hyperlipidemia -     LIPID PANEL 
 
8. MGUS (monoclonal gammopathy of unknown significance) 
-     PROTEIN ELECTROPHORESIS 
 
9. 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 
-     URIC ACID 10. Anemia in chronic kidney disease, on chronic dialysis (HCC) 
-     CBC WITH AUTOMATED DIFF 11. Proteinuria, unspecified type 
-     URINALYSIS W/ RFLX MICROSCOPIC 12. Prostate cancer ([facility] [address] 
-     CBC WITH AUTOMATED DIFF 
-     METABOLIC PANEL, COMPREHENSIVE 13. Increased glucose level -     METABOLIC PANEL, COMPREHENSIVE 
-     HEMOGLOBIN A1C WITH EAG Health Maintenance Due Topic Date Due  MEDICARE YEARLY EXAM  09/08/2018

## 2019-05-02 NOTE — ACP (ADVANCE CARE PLANNING)
Advance Care Planning    Advance Care Planning (ACP) Provider Conversation Snapshot    Date of ACP Conversation: 05/02/19  Persons included in Conversation:  patient  Length of ACP Conversation in minutes:  <16 minutes (Non-Billable)    Authorized Decision Maker (if patient is incapable of making informed decisions): This person is:    Other Legally Authorized Decision Maker (e.g. Next of Kin)  Wife        For Patients with Decision Making Capacity:   Values/Goals: Exploration of values, goals, and preferences if recovery is not expected, even with continued medical treatment in the event of:  Imminent death  Severe, permanent brain injury    Conversation Outcomes / Follow-Up Plan:   Recommended completion of Advance Directive form after review of ACP materials and conversation with prospective healthcare agent

## 2019-05-02 NOTE — PATIENT INSTRUCTIONS
Medicare Wellness Visit, Male The best way to live healthy is to have a lifestyle where you eat a well-balanced diet, exercise regularly, limit alcohol use, and quit all forms of tobacco/nicotine, if applicable. Regular preventive services are another way to keep healthy. Preventive services (vaccines, screening tests, monitoring & exams) can help personalize your care plan, which helps you manage your own care. Screening tests can find health problems at the earliest stages, when they are easiest to treat. [address] [facility] follows the current, evidence-based guidelines published by the [facility] Rao Keagan ([facility]) when recommending preventive services for our patients. Because we follow these guidelines, sometimes recommendations change over time as research supports it. (For example, a prostate screening blood test is no longer routinely recommended for men with no symptoms.) Of course, you and your doctor may decide to screen more often for some diseases, based on your risk and co-morbidities (chronic disease you are already diagnosed with). Preventive services for you include: - Medicare offers their members a free annual wellness visit, which is time for you and your primary care provider to discuss and plan for your preventive service needs. Take advantage of this benefit every year! 
-All adults over age 72 should receive the recommended pneumonia vaccines. Current USPSTF guidelines recommend a series of two vaccines for the best pneumonia protection.  
-All adults should have a flu vaccine yearly and an ECG.  All adults age 61 and older should receive a shingles vaccine once in their lifetime.   
-All adults age 38-68 who are overweight should have a diabetes screening test once every three years.  
-Other screening tests & preventive services for persons with diabetes include: an eye exam to screen for diabetic retinopathy, a kidney function test, a foot exam, and stricter control over your cholesterol.  
-Cardiovascular screening for adults with routine risk involves an electrocardiogram (ECG) at intervals determined by the provider.  
-Colorectal cancer screening should be done for adults age 54-65 with no increased risk factors for colorectal cancer. There are a number of acceptable methods of screening for this type of cancer. Each test has its own benefits and drawbacks. Discuss with your provider what is most appropriate for you during your annual wellness visit. The different tests include: colonoscopy (considered the best screening method), a fecal occult blood test, a fecal DNA test, and sigmoidoscopy. 
-All adults born between [facility] should be screened once for Hepatitis C. 
-An Abdominal Aortic Aneurysm (AAA) Screening is recommended for men age 73-68 who has ever smoked in their lifetime. Here is a list of your current Health Maintenance items (your personalized list of preventive services) with a due date: 
Health Maintenance Due Topic Date Due  
 AAA Screening  10/23/2013 Helio Harry Annual Well Visit  09/08/2018

## 2019-05-06 LAB
25(OH)D3+25(OH)D2 SERPL-MCNC: 22.4 NG/ML (ref 30–100)
ALBUMIN SERPL ELPH-MCNC: 3.6 G/DL (ref 2.9–4.4)
ALBUMIN SERPL-MCNC: 4.1 G/DL (ref 3.5–4.8)
ALBUMIN/GLOB SERPL: 1.3 {RATIO} (ref 0.7–1.7)
ALBUMIN/GLOB SERPL: 1.8 {RATIO} (ref 1.2–2.2)
ALP SERPL-CCNC: 58 IU/L (ref 39–117)
ALPHA1 GLOB SERPL ELPH-MCNC: 0.2 G/DL (ref 0–0.4)
ALPHA2 GLOB SERPL ELPH-MCNC: 0.7 G/DL (ref 0.4–1)
ALT SERPL-CCNC: 12 IU/L (ref 0–44)
APPEARANCE UR: CLEAR
AST SERPL-CCNC: 9 IU/L (ref 0–40)
B-GLOBULIN SERPL ELPH-MCNC: 0.8 G/DL (ref 0.7–1.3)
BACTERIA #/AREA URNS HPF: NORMAL /[HPF]
BASOPHILS # BLD AUTO: 0.1 X10E3/UL (ref 0–0.2)
BASOPHILS NFR BLD AUTO: 1 %
BILIRUB SERPL-MCNC: 0.4 MG/DL (ref 0–1.2)
BILIRUB UR QL STRIP: NEGATIVE
BUN SERPL-MCNC: 82 MG/DL (ref 8–27)
BUN/CREAT SERPL: 4 (ref 10–24)
CALCIUM SERPL-MCNC: 8.4 MG/DL (ref 8.6–10.2)
CASTS URNS QL MICRO: NORMAL /LPF
CHLORIDE SERPL-SCNC: 95 MMOL/L (ref 96–106)
CHOLEST SERPL-MCNC: 168 MG/DL (ref 100–199)
CO2 SERPL-SCNC: 20 MMOL/L (ref 20–29)
COLOR UR: YELLOW
CREAT SERPL-MCNC: 18.71 MG/DL (ref 0.76–1.27)
EOSINOPHIL # BLD AUTO: 0.2 X10E3/UL (ref 0–0.4)
EOSINOPHIL NFR BLD AUTO: 3 %
EPI CELLS #/AREA URNS HPF: NORMAL /HPF (ref 0–10)
ERYTHROCYTE [DISTWIDTH] IN BLOOD BY AUTOMATED COUNT: 15.6 % (ref 12.3–15.4)
EST. AVERAGE GLUCOSE BLD GHB EST-MCNC: 123 MG/DL
GAMMA GLOB SERPL ELPH-MCNC: 1.1 G/DL (ref 0.4–1.8)
GLOBULIN SER CALC-MCNC: 2.3 G/DL (ref 1.5–4.5)
GLOBULIN SER CALC-MCNC: 2.8 G/DL (ref 2.2–3.9)
GLUCOSE SERPL-MCNC: 87 MG/DL (ref 65–99)
GLUCOSE UR QL: NEGATIVE
HBA1C MFR BLD: 5.9 % (ref 4.8–5.6)
HCT VFR BLD AUTO: 26.8 % (ref 37.5–51)
HDLC SERPL-MCNC: 27 MG/DL
HGB BLD-MCNC: 8.7 G/DL (ref 13–17.7)
HGB UR QL STRIP: ABNORMAL
IMM GRANULOCYTES # BLD AUTO: 0 X10E3/UL (ref 0–0.1)
IMM GRANULOCYTES NFR BLD AUTO: 0 %
INTERPRETATION, 910389: NORMAL
INTERPRETATION: NORMAL
KETONES UR QL STRIP: NEGATIVE
LDLC SERPL CALC-MCNC: 96 MG/DL (ref 0–99)
LEUKOCYTE ESTERASE UR QL STRIP: NEGATIVE
LYMPHOCYTES # BLD AUTO: 1.4 X10E3/UL (ref 0.7–3.1)
LYMPHOCYTES NFR BLD AUTO: 18 %
M PROTEIN SERPL ELPH-MCNC: 0.8 G/DL
MCH RBC QN AUTO: 29.5 PG (ref 26.6–33)
MCHC RBC AUTO-ENTMCNC: 32.5 G/DL (ref 31.5–35.7)
MCV RBC AUTO: 91 FL (ref 79–97)
MICRO URNS: ABNORMAL
MONOCYTES # BLD AUTO: 1.2 X10E3/UL (ref 0.1–0.9)
MONOCYTES NFR BLD AUTO: 16 %
NEUTROPHILS # BLD AUTO: 4.9 X10E3/UL (ref 1.4–7)
NEUTROPHILS NFR BLD AUTO: 62 %
NITRITE UR QL STRIP: NEGATIVE
PDF IMAGE, 910387: NORMAL
PH UR STRIP: 7 [PH] (ref 5–7.5)
PLATELET # BLD AUTO: 185 X10E3/UL (ref 150–379)
PLEASE NOTE, 011150: ABNORMAL
POTASSIUM SERPL-SCNC: 5.4 MMOL/L (ref 3.5–5.2)
PROT SERPL-MCNC: 6.4 G/DL (ref 6–8.5)
PROT UR QL STRIP: ABNORMAL
RBC # BLD AUTO: 2.95 X10E6/UL (ref 4.14–5.8)
RBC #/AREA URNS HPF: NORMAL /HPF (ref 0–2)
SODIUM SERPL-SCNC: 140 MMOL/L (ref 134–144)
SP GR UR: 1.01 (ref 1–1.03)
TRIGL SERPL-MCNC: 225 MG/DL (ref 0–149)
TSH SERPL DL<=0.005 MIU/L-ACNC: 2.9 UIU/ML (ref 0.45–4.5)
URATE SERPL-MCNC: 6.5 MG/DL (ref 3.7–8.6)
UROBILINOGEN UR STRIP-MCNC: 0.2 MG/DL (ref 0.2–1)
VLDLC SERPL CALC-MCNC: 45 MG/DL (ref 5–40)
WBC # BLD AUTO: 7.8 X10E3/UL (ref 3.4–10.8)
WBC #/AREA URNS HPF: NORMAL /HPF (ref 0–5)

## 2019-05-06 NOTE — PROGRESS NOTES
Anemia worse. F/u with nephrologist.  Anthony Ramirez. Low Vit. D. Take OTC supp 4795-0466 units daily. Average BS remains in prediab range. Persistent mono gammopathy.   Rest O.K.

## 2020-05-01 ENCOUNTER — HOSPITAL ENCOUNTER (EMERGENCY)
Age: 70
Discharge: HOME | End: 2020-05-01
Attending: EMERGENCY MEDICINE
Payer: MEDICARE

## 2020-05-01 ENCOUNTER — APPOINTMENT (OUTPATIENT)
Dept: CT IMAGING | Age: 70
End: 2020-05-01
Attending: EMERGENCY MEDICINE
Payer: MEDICARE

## 2020-05-01 VITALS
OXYGEN SATURATION: 100 % | SYSTOLIC BLOOD PRESSURE: 160 MMHG | TEMPERATURE: 98.1 F | HEART RATE: 65 BPM | DIASTOLIC BLOOD PRESSURE: 85 MMHG | RESPIRATION RATE: 18 BRPM

## 2020-05-01 DIAGNOSIS — R55 SYNCOPE AND COLLAPSE: Primary | ICD-10-CM

## 2020-05-01 DIAGNOSIS — S01.01XA LACERATION OF SCALP, INITIAL ENCOUNTER: ICD-10-CM

## 2020-05-01 PROCEDURE — 93005 ELECTROCARDIOGRAM TRACING: CPT

## 2020-05-01 PROCEDURE — 70450 CT HEAD/BRAIN W/O DYE: CPT

## 2020-05-01 PROCEDURE — 75810000293 HC SIMP/SUPERF WND  RPR

## 2020-05-01 PROCEDURE — 99285 EMERGENCY DEPT VISIT HI MDM: CPT

## 2020-05-01 NOTE — ED TRIAGE NOTES
Patient arrives to the emergency department via EMS with a chief complaint of a syncopal event at work. Pt was standing on a heater and began to feel hot and faint had a syncopal event.  per EMS. Pt has small lac to the back of the head. EMS reports patent was only out for a few seconds. Pt has no complaints at this time.

## 2020-05-01 NOTE — DISCHARGE INSTRUCTIONS
Patient Education        Fainting: Care Instructions  Your Care Instructions    When you faint, or pass out, you lose consciousness for a short time. A brief drop in blood flow to the brain often causes it. When you fall or lie down, more blood flows to your brain and you regain consciousness. Emotional stress, pain, or overheating--especially if you have been standing--can make you faint. In these cases, fainting is usually not serious. But fainting can be a sign of a more serious problem. Your doctor may want you to have more tests to rule out other causes. The treatment you need depends on the reason why you fainted. The doctor has checked you carefully, but problems can develop later. If you notice any problems or new symptoms, get medical treatment right away. Follow-up care is a key part of your treatment and safety. Be sure to make and go to all appointments, and call your doctor if you are having problems. It's also a good idea to know your test results and keep a list of the medicines you take. How can you care for yourself at home? · Drink plenty of fluids to prevent dehydration. If you have kidney, heart, or liver disease and have to limit fluids, talk with your doctor before you increase your fluid intake. When should you call for help? Call 911 anytime you think you may need emergency care. For example, call if:    · You have symptoms of a heart problem. These may include:  ? Chest pain or pressure. ? Severe trouble breathing. ? A fast or irregular heartbeat. ? Lightheadedness or sudden weakness. ? Coughing up pink, foamy mucus. ? Passing out. After you call  911, the  may tell you to chew 1 adult-strength or 2 to 4 low-dose aspirin. Wait for an ambulance. Do not try to drive yourself.     · You have symptoms of a stroke. These may include:  ? Sudden numbness, tingling, weakness, or loss of movement in your face, arm, or leg, especially on only one side of your body.   ? Sudden vision changes. ? Sudden trouble speaking. ? Sudden confusion or trouble understanding simple statements. ? Sudden problems with walking or balance. ? A sudden, severe headache that is different from past headaches.     · You passed out (lost consciousness) again.    Watch closely for changes in your health, and be sure to contact your doctor if:    · You do not get better as expected. Where can you learn more? Go to http://emerson-adalgisa.info/  Enter A848 in the search box to learn more about \"Fainting: Care Instructions. \"  Current as of: June 26, 2019Content Version: 12.4  © 2354-4452 [employer]. Care instructions adapted under license by [employer]) (which disclaims liability or warranty for this information). If you have questions about a medical condition or this instruction, always ask your healthcare professional. [address] any warranty or liability for your use of this information. Patient Education        Cuts Closed With Adhesives: Care Instructions  Your Care Instructions  A cut can happen anywhere on your body. The doctor used an adhesive to close the cut. When the adhesive dries, it forms a film that holds the edges of the cut together. Skin adhesives are sometimes called liquid stitches. If the cut went deep and through the skin, the doctor may have put in a layer of stitches below the adhesive. The deeper layer of stitches brings the deep part of the cut together. These stitches will dissolve and don't need to be removed. You don't see the stitches, only the adhesive. You may have a bandage. The doctor has checked you carefully, but problems can develop later. If you notice any problems or new symptoms, get medical treatment right away. Follow-up care is a key part of your treatment and safety. Be sure to make and go to all appointments, and call your doctor if you are having problems.  It's also a good idea to know your test results and keep a list of the medicines you take. How can you care for yourself at home? · Keep the cut dry for the first 24 to 48 hours. After this, you can shower if your doctor okays it. Pat the cut dry. · Don't soak the cut, such as in a bathtub. Your doctor will tell you when it's safe to get the cut wet. · If your doctor told you how to care for your cut, follow your doctor's instructions. If you did not get instructions, follow this general advice:  ? Do not put any kind of ointment, cream, or lotion over the area. This can make the adhesive fall off too soon. ? After the first 24 to 48 hours, wash around the cut with clean water 2 times a day. Do not use hydrogen peroxide or alcohol, which can slow healing. ? If the doctor told you to use a bandage, put on a new bandage after cleaning the cut or if the bandage gets wet or dirty. · Prop up the sore area on a pillow anytime you sit or lie down during the next 3 days. Try to keep it above the level of your heart. This will help reduce swelling. · Leave the skin adhesive on your skin until it falls off on its own. This may take 5 to 10 days. · Do not scratch, rub, or pick at the adhesive. · Do not put the sticky part of a bandage directly on the adhesive. · Avoid any activity that could cause your cut to reopen. · Be safe with medicines. Read and follow all instructions on the label. ? If the doctor gave you a prescription medicine for pain, take it as prescribed. ? If you are not taking a prescription pain medicine, ask your doctor if you can take an over-the-counter medicine. When should you call for help?   Call your doctor now or seek immediate medical care if:    · You have new pain, or your pain gets worse.     · The skin near the cut is cold or pale or changes color.     · You have tingling, weakness, or numbness near the cut.     · The cut starts to bleed.     · You have trouble moving the area near the cut.     · You have symptoms of infection, such as:  ? Increased pain, swelling, warmth, or redness around the cut.  ? Red streaks leading from the cut.  ? Pus draining from the cut.  ? A fever.    Watch closely for changes in your health, and be sure to contact your doctor if:    · The cut reopens.     · You do not get better as expected. Where can you learn more? Go to http://emerson-adalgisa.info/  Enter P174 in the search box to learn more about \"Cuts Closed With Adhesives: Care Instructions. \"  Current as of: June 26, 2019Content Version: 12.4  © 7248-3076 [employer]. Care instructions adapted under license by [employer] (which disclaims liability or warranty for this information). If you have questions about a medical condition or this instruction, always ask your healthcare professional. [address] any warranty or liability for your use of this information.

## 2020-05-01 NOTE — ED PROVIDER NOTES
EMERGENCY DEPARTMENT HISTORY AND PHYSICAL EXAM          Date: 5/1/2020  Patient Name: Alyce Levine    History of Presenting Illness     Chief Complaint   Patient presents with    Syncope     syncopal episode lasting a few seconds at work. Small lac to the beack of the head       History Provided By: Patient and EMS    HPI: Alyce Levine is a 68 y.o. male, pmhx hypertension, gout, prediabetes, high cholesterol, MGUS, CKD on peritoneal dialysis, arthritis, who presents via EMS to the ED c/o syncope    Patient explains that when he undergoes peritoneal dialysis at home he had some abdominal distention and cramping and so he sleeps on his knees with a pillow under his abdomen to provide him support. Because of this he has a lot of chronic achy knee pain. To aid with that today at work he was standing on a weather strip that is approximately 2 inches tall and provides a lot of heat. He states he was feeling really warm, started to feel flushed and lightheaded and then passed out falling backwards hitting his head. States he does not remember the fall but he woke up with everyone around him and was back to his baseline according to EMS. His blood sugar in route was normal.  Patient does state he normally has breakfast around 10:00 with pastry and a boost but he did not have that today. Patient specifically denies any recent fevers, chills, nausea, vomiting, diarrhea, abd pain, CP, SOB, urinary sxs, changes in BM, or headache. His last peritoneal dialysis was last night approximately 9-1/2 hours without any complications. He states he is feeling well without any muscle spasms or issues prior to going to work today. PCP: Anushka Melton MD    Allergies: Amlodipine  Social Hx: Former tobacco, -vaping, -EtOH, -Illicit Drugs; There are no other complaints, changes, or physical findings at this time.      Current Outpatient Medications   Medication Sig Dispense Refill    simvastatin (ZOCOR) 40 mg tablet Take 1 Tab by mouth nightly. 30 Tab 0    ferric citrate (AURYXIA) 210 mg iron tablet Take  by mouth three (3) times daily (with meals).  carvedilol (COREG) 25 mg tablet Take 25 mg by mouth two (2) times daily (with meals).  B COMPLEX W-C NO.20/FOLIC ACID (RENAL CAPS PO) Take  by mouth. Past History     Past Medical History:  Past Medical History:   Diagnosis Date    Anemia in chronic kidney disease (CKD)     Arthritis     hands, knees, ankles- gout d/t CKD    Benign hypertensive renal disease 3/18/16    Dr. Yanique Escalera Chronic kidney disease, stage III (moderate) ([facility] [address] 03/18/2016    Dr. Lazarus Snuffer PD since 2/2017 after having catheter placed    Cold sore     right side of mouth, rare    GERD (gastroesophageal reflux disease)     hiatal hernia.  Gout 2013    Hiatal hernia     Hyperlipidemia     Hypertension     MGUS (monoclonal gammopathy of unknown significance) 3/22/2016    Persistent proteinuria     with m-spike, not seen 5/2013    Prediabetes     Prostate cancer ([facility] 07/31/2018    Proteinuria 3/18/16    Dr. Yanique Escalera Renal failure     Right knee pain 4/13/16    Dr. Fina Correa    Vitamin D deficiency        Past Surgical History:  Past Surgical History:   Procedure Laterality Date    ABDOMEN SURGERY PROC UNLISTED  02/24/2017    Laparoscopic insertion of peritoneal dialysis catheter    COLONOSCOPY N/A 3/16/2017    COLONOSCOPY performed by Debbe Duane, MD at [facility]  3/16/2017         ENDOSCOPY, COLON, DIAGNOSTIC  \"94, '05    Normal - repeat in 2014-15    HX ENDOSCOPY  2006/7    d/t dysphagia. had dilation.      HX HEENT  1956    tonsillectomy (4 yrs old)    HX HERNIA REPAIR  1969    Right inguinal hernia repair       Family History:  Family History   Problem Relation Age of Onset    Dementia Mother 80    Stroke Father 64        ?etiology    Hypertension Father     Sleep Apnea Son     No Known Problems Son     No Known Problems Maternal Grandmother     No Known Problems Maternal Grandfather     No Known Problems Paternal Grandmother     No Known Problems Paternal Grandfather        Social History:  Social History     Tobacco Use    Smoking status: Former Smoker     Packs/day: 0.50     Years: 3.00     Pack years: 1.50     Types: Cigarettes     Last attempt to quit: 1987     Years since quittin.3    Smokeless tobacco: Never Used   Substance Use Topics    Alcohol use: No     Comment: used to have drinks a day for 10 years. stopped  around .  Drug use: No       Allergies: Allergies   Allergen Reactions    Amlodipine Swelling    Other Medication Other (comments)     Eye Drop - Caused pain, redness and discomfort         Review of Systems   Review of Systems   Constitutional: Negative for activity change, appetite change, chills, fever and unexpected weight change. HENT: Negative for congestion. Eyes: Negative for pain and visual disturbance. Respiratory: Negative for cough and shortness of breath. Cardiovascular: Negative for chest pain. Gastrointestinal: Negative for abdominal pain, diarrhea, nausea and vomiting. Genitourinary: Negative for dysuria. Musculoskeletal: Positive for arthralgias. Negative for back pain and joint swelling. Skin: Negative for rash. Neurological: Positive for syncope. Negative for headaches. Physical Exam   Physical Exam  Vitals signs and nursing note reviewed. Constitutional:       Appearance: He is well-developed. He is not diaphoretic. Comments: Cordie Tomas appearing elderly male currently in no acute distress   HENT:      Head: Normocephalic. Comments: Small 1 cm superficial laceration to the posterior parieto-occipital scalp without acute bleeding or underlying hematoma     Mouth/Throat:      Mouth: Mucous membranes are dry. Eyes:      General:         Right eye: No discharge. Left eye: No discharge.       Conjunctiva/sclera: Conjunctivae normal.      Pupils: Pupils are equal, round, and reactive to light. Neck:      Musculoskeletal: Normal range of motion and neck supple. No neck rigidity or muscular tenderness. Comments: There is no C-spine tenderness  Cardiovascular:      Rate and Rhythm: Normal rate and regular rhythm. Heart sounds: Normal heart sounds. No murmur. Pulmonary:      Effort: Pulmonary effort is normal. No respiratory distress. Breath sounds: Normal breath sounds. No stridor. No wheezing, rhonchi or rales. Abdominal:      General: Bowel sounds are normal. There is no distension. Palpations: Abdomen is soft. There is no mass. Tenderness: There is no abdominal tenderness. There is no guarding or rebound. Hernia: No hernia is present. Comments: Dialysis catheter left lower quadrant without any dressing in place; skin appears clean and dry without any surrounding erythema; abdomen is soft and nontender. Musculoskeletal: Normal range of motion. Lymphadenopathy:      Cervical: No cervical adenopathy. Skin:     General: Skin is warm and dry. Findings: No rash. Comments: Pain patch in place to left chest   Neurological:      Mental Status: He is alert and oriented to person, place, and time. Cranial Nerves: No cranial nerve deficit. Motor: No abnormal muscle tone.        Diagnostic Study Results     Labs -     Recent Results (from the past 12 hour(s))   EKG, 12 LEAD, INITIAL    Collection Time: 05/01/20 12:05 PM   Result Value Ref Range    Ventricular Rate 58 BPM    Atrial Rate 58 BPM    P-R Interval 182 ms    QRS Duration 100 ms    Q-T Interval 432 ms    QTC Calculation (Bezet) 424 ms    Calculated P Axis 22 degrees    Calculated R Axis -16 degrees    Calculated T Axis -6 degrees    Diagnosis       Sinus bradycardia  Moderate voltage criteria for LVH, may be normal variant  When compared with ECG of 01-FEB-2017 14:13,  Nonspecific T wave abnormality no longer evident in Lateral leads         Radiologic Studies -   CT HEAD WO CONT   Final Result   IMPRESSION: No acute intracranial abnormality. CT Results  (Last 48 hours)               05/01/20 1228  CT HEAD WO CONT Final result    Impression:  IMPRESSION: No acute intracranial abnormality. Narrative:  HEAD CT WITHOUT CONTRAST: 5/1/2020 12:28 PM       INDICATION: fall       COMPARISON: None. PROCEDURE: Axial images of the head were obtained without contrast. Coronal and   sagittal reformats were performed. CT dose reduction was achieved through use of   a standardized protocol tailored for this examination and automatic exposure   control for dose modulation. FINDINGS: Periventricular white matter hypodensity is nonspecific, but   consistent with chronic small vessel ischemic disease. The ventricles and sulci   are appropriate in size and configuration for age. No loss of gray-white   differentiation to suggest late acute or early subacute infarction. No mass   effect or intracranial hemorrhage. CXR Results  (Last 48 hours)    None            Medical Decision Making   I am the first provider for this patient. I reviewed the vital signs, available nursing notes, past medical history, past surgical history, family history and social history. Vital Signs-Reviewed the patient's vital signs. Patient Vitals for the past 12 hrs:   Temp Pulse Resp BP SpO2   05/01/20 1330  65 18 160/85 100 %   05/01/20 1300  61 13 174/85 100 %   05/01/20 1202 98.1 °F (36.7 °C) 61 16 151/86 100 %       Pulse Oximetry Analysis - 100% on RA    Cardiac Monitor:   Rate: 60bpm  Rhythm: Normal Sinus Rhythm      Records Reviewed: Nursing Notes, Old Medical Records, Previous electrocardiograms, Ambulance Run Sheet, Previous Radiology Studies and Previous Laboratory Studies    Provider Notes (Medical Decision Making):   MDM: Elderly male with a history of renal disease presenting for syncope.   He did not eat his breakfast this morning which is unusual for him. He was standing on a hot pad when he is felt abnormal and denies any chest pain prior to his symptoms. His neurologic exam is nonfocal with low concern for stroke. Discussed with patient the option for lab testing but he states he feels well and does not feel that it is necessary at this time. He is nexus to negative and thus does not appear to need a cervical spine CT scan at this time    ED Course:   Initial assessment performed. The patients presenting problems have been discussed, and they are in agreement with the care plan formulated and outlined with them. I have encouraged them to ask questions as they arise throughout their visit. EKG interpretation: (Preliminary)  Rhythm: Sinus bradycardia at a rate of 58 bpm; normal AR; normal QRS; normal QTC; left axis deviation; T wave inversions noted in 3 with flattening in aVF. Unchanged since 4/2019. This EKG was interpreted by ED Provider Franky Thrasher MD    PROGRESS NOTE:  1:10 PM  Updates given from patient's son to the nurse. Patient has syncopal episode last year and was admitted at [address] [place] [address] for full evaluation with no diagnosis found. Await scan results. Procedure Note - Laceration Repair:  4:43 PM  Procedure by Franky Thrasher MD .  Complexity: simple  1cm linear laceration to scalp  was irrigated copiously with NS by nursing. The wound was explored with the following results: No foreign bodies found. The wound was repaired with Dermabond. The wound was closed with good hemostasis and approximation. Sterile dressing applied. The procedure took 1-15 minutes, and pt tolerated well. Discharge note:  Pt re-evaluated and noted to be feeling better, ready for discharge. Updated pt on all final lab findings. Will follow up as instructed. All questions have been answered, pt voiced understanding and agreement with plan.  Specific return precautions provided as well as instructions to return to the ED should sx worsen at any time. Vital signs stable for discharge. Critical Care Time:   0      Diagnosis     Clinical Impression:   1. Syncope and collapse    2. Laceration of scalp, initial encounter        PLAN:  1. Discharge Medication List as of 5/1/2020  1:43 PM        2. Follow-up Information     Follow up With Specialties Details Why Contact Info    Read, Jules Price MD Family Practice  As needed [address]  [address]  [phone]      [facility] EMERGENCY DEPT Emergency Medicine  If symptoms worsen [address] [facility]  [address] [facility]  [phone]        Return to ED if worse     Disposition:  Home       Please note, this dictation was completed with [employer], the computer voice recognition software. Quite often unanticipated grammatical, syntax, homophones, and other interpretive errors are inadvertently transcribed by the computer software. Please disregard these errors. Please excuse any errors that have escaped final proof reading.

## 2020-05-01 NOTE — ED NOTES
Frank Karen Lele [phone] is concerned pt may have had a seizure cause he had a syncopal episode a year ago that they could not find an answer on why.

## 2020-05-03 LAB
ATRIAL RATE: 58 BPM
CALCULATED P AXIS, ECG09: 22 DEGREES
CALCULATED R AXIS, ECG10: -16 DEGREES
CALCULATED T AXIS, ECG11: -6 DEGREES
DIAGNOSIS, 93000: NORMAL
P-R INTERVAL, ECG05: 182 MS
Q-T INTERVAL, ECG07: 432 MS
QRS DURATION, ECG06: 100 MS
QTC CALCULATION (BEZET), ECG08: 424 MS
VENTRICULAR RATE, ECG03: 58 BPM

## 2020-05-04 ENCOUNTER — PATIENT OUTREACH (OUTPATIENT)
Dept: FAMILY MEDICINE CLINIC | Age: 70
End: 2020-05-04

## 2020-05-04 NOTE — PROGRESS NOTES
Patient contacted regarding recent discharge and COVID-19 risk   Care Transition Nurse/ Ambulatory Care Manager contacted the patient by telephone to perform post discharge assessment. Verified name and  with patient as identifiers. Patient has following risk factors of: chronic kidney disease. CTN/ACM reviewed discharge instructions, medical action plan and red flags related to discharge diagnosis. Reviewed and educated them on any new and changed medications related to discharge diagnosis. Advised obtaining a 90-day supply of all daily and as-needed medications. Education provided regarding infection prevention, and signs and symptoms of COVID-19 and when to seek medical attention with patient who verbalized understanding. Discussed exposure protocols and quarantine from [address] you at higher risk for severe illness  and given an opportunity for questions and concerns. The patient agrees to contact the COVID-19 hotline [phone] or PCP office for questions related to their healthcare. CTN/ACM provided contact information for future reference. From CDC: Are you at higher risk for severe illness?  Wash your hands often.  Avoid close contact (6 feet, which is about two arm lengths) with people who are sick.  Put distance between yourself and other people if COVID-19 is spreading in your community.  Clean and disinfect frequently touched surfaces.  Avoid all cruise travel and non-essential air travel.  Call your healthcare professional if you have concerns about COVID-19 and your underlying condition or if you are sick. For more information on steps you can take to protect yourself, see CDC's How to Protect Yourself      Patient/family/caregiver given information for Marko Finney and agrees to enroll yes  Patient's preferred e-mail:  [email or web address]  Patient's preferred phone number:[phone]  Based on Loop alert triggers, patient will be contacted by nurse care manager for worsening symptoms. Pt will be further monitored by COVID Loop Team based on severity of symptoms and risk factors.  DMB

## 2020-05-06 NOTE — PROGRESS NOTES
Neurology Note    Patient ID:  Stephenie Jordan  [id number]  68 y.o.  1950      Date of Consultation:  May 7, 2020    Referring Physician: Dr. Jacquelyn Griffiths    Reason for Consultation:  syncope    Subjective: I have passed out. History of Present Illness:   Stephenie Jordan is a 68 y.o. male who presents to the neurology clinic at [facility] for an evaluation. He has a history of hypertension, gout, prediabetes, dyslipidemia, monoclonal gammopathy, chronic kidney disease on peritoneal dialysis and arthritis. He was just seen in the emergency department on May 1, 2020 after having a syncopal event while at work. The patient reports that his medical conditions date back to 2017 when he ultimately developed kidney failure and went on peritoneal dialysis. He reports that his kidney disease was related to hypertension. He was placed on the transplant list and went through a thorough medical evaluation. It was during that time that he was found that he had prostate cancer and did have surgery for his prostate in October 2018. He felt that his overall medical health was limping along without many major medical issues until April 2019. He was over at his son's and getting ready to have a meal.  He was sitting at the table and then subsequently passed out. His son initially had thought that he had fallen asleep. When he was difficult to arouse he started to perform the Heimlich maneuver. The patient could then ultimately begin to hear sounds. There was no evidence of convulsions at that time. There was no bowel bladder incontinence. He was admitted to [address] [place] [address] and stayed there for 2 days. He reports that they did diagnostic testing and no clear etiology was found. He then was at his normal state of health for approximately the next 11 months and then another event occurred. This was approximately 1 month ago and occurred while he was at work.   He works as a dispatcher. He began to feel nauseous and warm and then he collapsed. He was out for a few seconds. There was no convulsions noted. He then woke up and went to the bathroom and had a bowel movement. He felt fine after this. Last week while at work he had his third episode. He was talking to people and all of a sudden felt stuffy and nauseous. He opened the door to get fresh air and then he fell backwards. There was no convulsions associated with this. He did hit his head and was brought to the emergency department. He was kept in the emergency room for just over 2 hours and then was subsequently discharged. He states that he had not eaten before 2 of those episodes but he is unaware of any other triggers. The symptoms that are similar for the last 2 was that he felt nauseous and stuffy prior to the events. There was no convulsions or rhythmic movements seen with any of the episodes. There was no automatisms. Past Medical History:   Diagnosis Date    Anemia in chronic kidney disease (CKD)     Arthritis     hands, knees, ankles- gout d/t CKD    Benign hypertensive renal disease 3/18/16    Dr. Jasno Bland Chronic kidney disease, stage III (moderate) ([facility] 03/18/2016    Dr. Cuca Menendez PD since 2/2017 after having catheter placed    Cold sore     right side of mouth, rare    GERD (gastroesophageal reflux disease)     hiatal hernia.      Gout 2013    Hiatal hernia     Hyperlipidemia     Hypertension     MGUS (monoclonal gammopathy of unknown significance) 3/22/2016    Persistent proteinuria     with m-spike, not seen 5/2013    Prediabetes     Prostate cancer ([facility] 07/31/2018    Proteinuria 3/18/16    Dr. Jason Bland Renal failure     Right knee pain 4/13/16    Dr. Michelle Hi    Vitamin D deficiency         Past Surgical History:   Procedure Laterality Date    ABDOMEN SURGERY PROC UNLISTED  02/24/2017    Laparoscopic insertion of peritoneal dialysis catheter    COLONOSCOPY N/A 3/16/2017    COLONOSCOPY performed by Jessica Boothe MD at [facility]  3/16/2017         ENDOSCOPY, COLON, DIAGNOSTIC  \"94, '05    Normal - repeat in 2014-15    HX ENDOSCOPY      d/t dysphagia. had dilation.  HX HEENT      tonsillectomy (4 yrs old)   [address] [place] [address] HERNIA REPAIR  1969    Right inguinal hernia repair        Family History   Problem Relation Age of Onset    Dementia Mother 80    Stroke Father 64        ?etiology    Hypertension Father     Sleep Apnea Son     No Known Problems Son     No Known Problems Maternal Grandmother     No Known Problems Maternal Grandfather     No Known Problems Paternal Grandmother     No Known Problems Paternal Grandfather         Social History     Tobacco Use    Smoking status: Former Smoker     Packs/day: 0.50     Years: 3.00     Pack years: 1.50     Types: Cigarettes     Last attempt to quit: 1987     Years since quittin.3    Smokeless tobacco: Never Used   Substance Use Topics    Alcohol use: No     Comment: used to have drinks a day for 10 years. stopped  around . Allergies   Allergen Reactions    Amlodipine Swelling    Other Medication Other (comments)     Eye Drop - Caused pain, redness and discomfort        Prior to Admission medications    Medication Sig Start Date End Date Taking? Authorizing Provider   cinacalcet (SENSIPAR) 30 mg tablet cinacalcet 30 mg tablet   Take 1 tablet every day by oral route as directed. Yes Provider, Historical   cloNIDine (CATAPRES) 0.1 mg/24 hr ptwk USE AS DIRECTED ONCE WEEKLY 20  Yes Provider, Historical   sevelamer carbonate (RENVELA) 800 mg tab tab sevelamer carbonate 800 mg tablet   Yes Provider, Historical   furosemide (LASIX) 80 mg tablet TAKE 1 TABLET BY MOUTH TWICE DAILY 20  Yes Provider, Historical   calcitRIOL (ROCALTROL) 0.25 mcg capsule Take 0.75 mcg by mouth daily.    Yes Provider, Historical   carvedilol (COREG) 25 mg tablet Take 25 mg by mouth two (2) times daily (with meals). Yes Provider, Historical   B COMPLEX W-C NO.20/FOLIC ACID (RENAL CAPS PO) Take  by mouth. Yes Other, MD Calin   losartan (COZAAR) 25 mg tablet TAKE 1 TABLET BY MOUTH AT BEDTIME 3/1/20   Provider, Historical   simvastatin (ZOCOR) 40 mg tablet Take 1 Tab by mouth nightly. 4/10/19   Desi Forrester NP   ferric citrate (AURYXIA) 210 mg iron tablet Take  by mouth three (3) times daily (with meals). Provider, Historical       Review of Systems:    General, constitutional: fatigue  Eyes, vision: negative  Ears, nose, throat: negative  Cardiovascular, heart: negative  Respiratory: shortness of breath  Gastrointestinal: negative  Genitourinary: negative  Musculoskeletal: joint pain  Skin and integumentary: negative  Psychiatric: negative  Endocrine: negative  Neurological: negative, except for HPI  Hematologic/lymphatic: negative  Allergy/immunology: negative      Objective:     Visit Vitals  /60   Pulse 80   Temp 97.3 °F (36.3 °C) (Oral)   Resp 16   Ht 5' 8\" (1.727 m)   Wt 178 lb 12.8 oz (81.1 kg)   SpO2 98%   BMI 27.19 kg/m²       Physical Exam:    General:  appears well nourished in no acute distress  Neck: Possible carotid bruit on the left versus radiation of the cardiac murmur  Lungs: clear to auscultation  Heart: He does have a cardiac murmur. There does appear to be radiation into the left carotid  Lower extremity: peripheral pulses palpable and no edema  Skin: intact    Neurological exam:    Awake, alert, oriented to person, place and time  Recent and remote memory were normal  Attention and concentration were intact  Language was intact.   There was no aphasia  Speech: no dysarthria  Fund of knowledge was preserved    Cranial nerves:   II-XII were tested    Perrrla  Fundoscopic examination revealed venous pulsations and no clear abnormalities  Visual fields were full  Eomi, no evidence of nystagmus  Facial sensation:  normal and symmetric  Facial motor: normal and symmetric  Hearing intact  SCM strength intact  Tongue: midline without fasciculations    Motor: Tone normal    No evidence of fasciculations    Pronator drift was absent    Strength testing:   deltoid triceps biceps Wrist ext. Wrist flex. intrinsics Hip flex. Hip ext. Knee ext. Knee flex Dorsi flex Plantar flex   Right 5 5 5 5 5 5 5 5 5 5 5 5   Left 5 5 5 5 5 5 5 5 5 5 5 5     Sensory:  Upper extremity: intact to pp, light touch, and vibration > 10 seconds  Lower extremity: intact to pp, light touch, and vibration > 10 seconds    Reflexes:    Right Left  Biceps  2 2  Triceps 2 2  Brachiorad. 2 2  Patella  2 2  Achilles 2 2    Plantar response:  flexor bilaterally      Cerebellar testing:  no tremor apparent, finger/nose and meme were intact    Romberg: absent    Gait: steady. Heel, toe, and tandem gait were normal    Labs:     Lab Results   Component Value Date/Time    Hemoglobin A1c 5.9 (H) 05/02/2019 03:48 PM    Sodium 140 05/02/2019 03:48 PM    Potassium 5.4 (H) 05/02/2019 03:48 PM    Chloride 95 (L) 05/02/2019 03:48 PM    Glucose 87 05/02/2019 03:48 PM    BUN 82 (HH) 05/02/2019 03:48 PM    Creatinine 18.71 (HH) 05/02/2019 03:48 PM    Calcium 8.4 (L) 05/02/2019 03:48 PM    WBC 7.8 05/02/2019 03:48 PM    HCT 26.8 (L) 05/02/2019 03:48 PM    HGB 8.7 (L) 05/02/2019 03:48 PM    PLATELET 267 20/14/7196 03:48 PM       Imaging:    No results found for this or any previous visit. Results from Hospital Encounter encounter on 05/01/20   CT HEAD WO CONT    Narrative HEAD CT WITHOUT CONTRAST: 5/1/2020 12:28 PM    INDICATION: fall    COMPARISON: None. PROCEDURE: Axial images of the head were obtained without contrast. Coronal and  sagittal reformats were performed. CT dose reduction was achieved through use of  a standardized protocol tailored for this examination and automatic exposure  control for dose modulation.     FINDINGS: Periventricular white matter hypodensity is nonspecific, but  consistent with chronic small vessel ischemic disease. The ventricles and sulci  are appropriate in size and configuration for age. No loss of gray-white  differentiation to suggest late acute or early subacute infarction. No mass  effect or intracranial hemorrhage. Impression IMPRESSION: No acute intracranial abnormality. I did independently review the head CT from May 1, 2020. There was some evidence of chronic periventricular small vessel ischemic disease. There is no space-occupying lesion or acute abnormality    Assessment and Plan:    Patient is a pleasant 23-year-old gentleman with multiple medical problems as noted below which impacts his neurological health who has had 3 syncopal events within the past 13 months. His neurological examination was unremarkable however a cardiac murmur with radiation into his left carotid versus a carotid bruit was auscultated. Syncope x3:    The differential for this does include a neurogenic versus a cardiogenic origin. His neurological examination was unremarkable today, but a cardiac murmur was noted. I did not see this on prior notes. The events did not appear to be based in epilepsy from the clinical history that was provided. They are concerned that the last 2 episodes occurred closer together. I will arrange for the patient to have a carotid Doppler study to ensure that he is getting adequate cerebrovascular flow. I will arrange for him to have a Holter monitor to look for cardiac arrhythmias. I did stressed to him the importance of following up with his primary care doctor and his nephrologist in regards to a formal dedicated cardiac evaluation. He is in agreement with the plan. He may need additional testing. I did stressed to him the importance of adequate nutrition and hydration as this could precipitate some of the events that he had. I will follow-up with him in clinic once the above 2 testing are performed.   I did stress to him him again the importance of following up with his primary care doctor and his nephrologist for additional testing that may need to be performed in regards to etiologies of his syncope.        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

## 2020-05-07 ENCOUNTER — OFFICE VISIT (OUTPATIENT)
Dept: NEUROLOGY | Age: 70
End: 2020-05-07

## 2020-05-07 VITALS
HEIGHT: 68 IN | BODY MASS INDEX: 27.1 KG/M2 | TEMPERATURE: 97.3 F | SYSTOLIC BLOOD PRESSURE: 138 MMHG | OXYGEN SATURATION: 98 % | DIASTOLIC BLOOD PRESSURE: 60 MMHG | RESPIRATION RATE: 16 BRPM | HEART RATE: 80 BPM | WEIGHT: 178.8 LBS

## 2020-05-07 DIAGNOSIS — R55 SYNCOPE, UNSPECIFIED SYNCOPE TYPE: Primary | ICD-10-CM

## 2020-05-07 RX ORDER — CLONIDINE 0.2 MG/24H
PATCH, EXTENDED RELEASE TRANSDERMAL
COMMUNITY
Start: 2020-04-02 | End: 2020-05-07 | Stop reason: SDUPTHER

## 2020-05-07 RX ORDER — CLONIDINE 0.2 MG/24H
PATCH, EXTENDED RELEASE TRANSDERMAL
COMMUNITY
End: 2020-05-07 | Stop reason: SDUPTHER

## 2020-05-07 RX ORDER — CLONIDINE 0.1 MG/24H
PATCH, EXTENDED RELEASE TRANSDERMAL
COMMUNITY
Start: 2020-04-08

## 2020-05-07 RX ORDER — SEVELAMER CARBONATE 800 MG/1
TABLET, FILM COATED ORAL
COMMUNITY

## 2020-05-07 RX ORDER — LOSARTAN POTASSIUM 25 MG/1
TABLET ORAL
COMMUNITY
Start: 2020-03-01

## 2020-05-07 RX ORDER — FUROSEMIDE 80 MG/1
TABLET ORAL
COMMUNITY
Start: 2020-04-01

## 2020-05-07 RX ORDER — CINACALCET 30 MG/1
TABLET, FILM COATED ORAL
COMMUNITY

## 2020-05-07 RX ORDER — CALCITRIOL 0.25 UG/1
0.75 CAPSULE ORAL DAILY
COMMUNITY

## 2020-05-07 NOTE — LETTER
5/7/20 Patient: Alyce Levine YOB: 1950 Date of Visit: 5/7/2020 Imtiaz Rahman, [address] 
[address] [address] VIA In Basket Dear Imtiaz Rahman MD, Thank you for referring Mr. Jess Amaral to [facility] for evaluation. My notes for this consultation are attached. If you have questions, please do not hesitate to call me. I look forward to following your patient along with you. Sincerely, Rene Scruggs, DO

## 2020-05-13 ENCOUNTER — HOSPITAL ENCOUNTER (OUTPATIENT)
Dept: NON INVASIVE DIAGNOSTICS | Age: 70
Discharge: HOME | End: 2020-05-13
Attending: PSYCHIATRY & NEUROLOGY
Payer: MEDICARE

## 2020-05-13 ENCOUNTER — HOSPITAL ENCOUNTER (OUTPATIENT)
Dept: VASCULAR SURGERY | Age: 70
Discharge: HOME | End: 2020-05-13
Attending: PSYCHIATRY & NEUROLOGY
Payer: MEDICARE

## 2020-05-13 DIAGNOSIS — R55 SYNCOPE, UNSPECIFIED SYNCOPE TYPE: ICD-10-CM

## 2020-05-13 DIAGNOSIS — R55 SYNCOPE: ICD-10-CM

## 2020-05-13 PROCEDURE — 93880 EXTRACRANIAL BILAT STUDY: CPT

## 2020-05-13 PROCEDURE — 93271 ECG/MONITORING AND ANALYSIS: CPT

## 2020-05-14 LAB
LEFT CCA DIST DIAS: 20.7 CM/S
LEFT CCA DIST SYS: 83.4 CM/S
LEFT CCA PROX DIAS: 15.4 CM/S
LEFT CCA PROX SYS: 93.4 CM/S
LEFT ECA DIAS: 10.74 CM/S
LEFT ECA SYS: 96.2 CM/S
LEFT ICA DIST DIAS: 23.3 CM/S
LEFT ICA DIST SYS: 63.1 CM/S
LEFT ICA MID DIAS: 24.5 CM/S
LEFT ICA MID SYS: 69.1 CM/S
LEFT ICA PROX DIAS: 21 CM/S
LEFT ICA PROX SYS: 75.1 CM/S
LEFT ICA/CCA SYS: 0.9
LEFT SUBCLAVIAN DIAS: 3.29 CM/S
LEFT SUBCLAVIAN SYS: 63.9 CM/S
LEFT VERTEBRAL DIAS: 34.8 CM/S
LEFT VERTEBRAL SYS: 59 CM/S
RIGHT CCA DIST DIAS: 15.6 CM/S
RIGHT CCA DIST SYS: 78.7 CM/S
RIGHT CCA PROX DIAS: 17.5 CM/S
RIGHT CCA PROX SYS: 88.4 CM/S
RIGHT ECA DIAS: 6.89 CM/S
RIGHT ECA SYS: 82.9 CM/S
RIGHT ICA DIST DIAS: 22.1 CM/S
RIGHT ICA DIST SYS: 63.1 CM/S
RIGHT ICA MID DIAS: 15.3 CM/S
RIGHT ICA MID SYS: 43.6 CM/S
RIGHT ICA PROX DIAS: 21.5 CM/S
RIGHT ICA PROX SYS: 70.8 CM/S
RIGHT ICA/CCA SYS: 0.9
RIGHT SUBCLAVIAN DIAS: 0 CM/S
RIGHT SUBCLAVIAN SYS: 95.2 CM/S
RIGHT VERTEBRAL DIAS: 32.39 CM/S
RIGHT VERTEBRAL SYS: 83.1 CM/S

## 2021-12-16 ENCOUNTER — HOSPITAL ENCOUNTER (OUTPATIENT)
Dept: CARDIAC CATH/INVASIVE PROCEDURES | Age: 71
Discharge: HOME | End: 2021-12-16
Attending: INTERNAL MEDICINE | Admitting: INTERNAL MEDICINE
Payer: MEDICARE

## 2021-12-16 VITALS
SYSTOLIC BLOOD PRESSURE: 183 MMHG | HEIGHT: 68 IN | TEMPERATURE: 98.7 F | BODY MASS INDEX: 28.79 KG/M2 | DIASTOLIC BLOOD PRESSURE: 76 MMHG | RESPIRATION RATE: 18 BRPM | HEART RATE: 58 BPM | WEIGHT: 190 LBS | OXYGEN SATURATION: 99 %

## 2021-12-16 DIAGNOSIS — R55 SYNCOPE, UNSPECIFIED SYNCOPE TYPE: ICD-10-CM

## 2021-12-16 LAB
GLUCOSE BLD STRIP.AUTO-MCNC: 100 MG/DL (ref 65–117)
SERVICE CMNT-IMP: NORMAL

## 2021-12-16 PROCEDURE — 93660 TILT TABLE EVALUATION: CPT

## 2021-12-16 PROCEDURE — 82962 GLUCOSE BLOOD TEST: CPT

## 2021-12-16 NOTE — PROGRESS NOTES
Cardiac Cath Lab Recovery Arrival Note:      Alyson Tanner arrived to Cardiac Cath Lab, Recovery Area. Staff introduced to patient. Patient identifiers verified with NAME and DATE OF BIRTH. Procedure verified with patient. Consent forms reviewed and signed by patient or authorized representative and verified. Allergies verified. Patient and family oriented to department. Patient and family informed of procedure and plan of care. Questions answered with review. Patient prepped for procedure, per orders from physician, prior to arrival.    Patient on cardiac monitor, non-invasive blood pressure, SPO2 monitor. On room air. Patient is A&Ox 3. Patient reports no complaints of pain or discomfort. Patient in stretcher, in low position, with side rails up, call bell within reach, patient instructed to call if assistance as needed. Patient prep in: [address], [place] [address]. Prep by: General Petty Pineda

## 2021-12-18 NOTE — PROCEDURES
[address]  PROCEDURE NOTE    Name:  Sage Escalante  MR#:  [id number]  :  1950  ACCOUNT #:  [de-identified]  DATE OF SERVICE:  2021    PROCEDURES PERFORMED:  Head-up tilt table test.    SURGEON:  Alexei Cordova MD    COMPLICATIONS:  none    INDICATION:  Recurrent syncope. DESCRIPTION OF PROCEDURE:  The patient was brought to the cardiac catheterization lab in a fasting state. Informed consent was obtained. Blood pressure, electrocardiogram, and pulse oximetry were monitored. The patient was placed in a head-up 70-degree tilt position and the procedure was begun. Baseline heart rate was 60 beats per minutes with sinus rhythm with a blood pressure of 163/84 and mean of 115. The patient was monitored for a total of 35 minutes. His blood pressure did initially raise to 213/89 and remained elevated. There was no significant drop in blood pressure or heart rate, which remained around 60 beats per minute. The patient had not had any symptoms of syncope or near syncope. CONCLUSION:  Negative head-up tilt tablet test as described above. PLAN:  Discharged to home and follow up with Dr. Karyna Patel.         MD RYAN [email or web address]  D:  2021 11:35  T:  2021 19:35  JOB #:  [id number]  CC:  MD Rayshawn Bello MD

## 2022-03-19 PROBLEM — C61 PROSTATE CANCER (HCC): Status: ACTIVE | Noted: 2018-07-31

## 2022-03-20 PROBLEM — N18.6 END STAGE RENAL DISEASE (HCC): Status: ACTIVE | Noted: 2019-05-02

## 2023-05-11 RX ORDER — SIMVASTATIN 40 MG
TABLET ORAL
COMMUNITY
Start: 2019-04-10

## 2023-05-11 RX ORDER — CLONIDINE 0.1 MG/24H
PATCH, EXTENDED RELEASE TRANSDERMAL
COMMUNITY
Start: 2020-04-08

## 2023-05-11 RX ORDER — SEVELAMER CARBONATE 800 MG/1
TABLET, FILM COATED ORAL
COMMUNITY

## 2023-05-11 RX ORDER — CINACALCET 30 MG/1
TABLET, FILM COATED ORAL
COMMUNITY

## 2023-05-11 RX ORDER — CALCITRIOL 0.25 UG/1
CAPSULE, LIQUID FILLED ORAL DAILY
COMMUNITY

## 2023-05-11 RX ORDER — FUROSEMIDE 80 MG
1 TABLET ORAL 2 TIMES DAILY
COMMUNITY
Start: 2020-04-01

## 2023-05-11 RX ORDER — CARVEDILOL 25 MG/1
TABLET ORAL 2 TIMES DAILY WITH MEALS
COMMUNITY

## 2023-05-11 RX ORDER — LOSARTAN POTASSIUM 25 MG/1
1 TABLET ORAL NIGHTLY
COMMUNITY
Start: 2020-03-01

## (undated) DEVICE — BLADELESS OPTICAL TROCAR WITH FIXATION CANNULA: Brand: VERSAPORT

## (undated) DEVICE — NEONATAL-ADULT SPO2 SENSOR: Brand: NELLCOR

## (undated) DEVICE — NON-REM POLYHESIVE PATIENT RETURN ELECTRODE: Brand: VALLEYLAB

## (undated) DEVICE — CATH IV AUTOGRD BC PNK 20GA 25 -- INSYTE

## (undated) DEVICE — 1200 GUARD II KIT W/5MM TUBE W/O VAC TUBE: Brand: GUARDIAN

## (undated) DEVICE — BASIN EMESIS 500CC ROSE 250/CS 60/PLT: Brand: MEDEGEN MEDICAL PRODUCTS, LLC

## (undated) DEVICE — (D)STRIP SKN CLSR 0.5X4IN WHT --

## (undated) DEVICE — GAUZE SPONGES,12 PLY: Brand: CURITY

## (undated) DEVICE — UNIVERSAL FIXATION CANNULA: Brand: VERSAONE

## (undated) DEVICE — (D)SYR 10ML 1/5ML GRAD NSAF -- PKGING CHANGE USE ITEM 338027

## (undated) DEVICE — MASTISOL ADHESIVE LIQ 2/3ML

## (undated) DEVICE — KENDALL SCD EXPRESS SLEEVES, KNEE LENGTH, MEDIUM: Brand: KENDALL SCD

## (undated) DEVICE — SNARE ENDOSCP M L240CM W27MM SHTH DIA2.4MM CHN 2.8MM OVL

## (undated) DEVICE — KENDALL RADIOLUCENT FOAM MONITORING ELECTRODE RECTANGULAR SHAPE: Brand: KENDALL

## (undated) DEVICE — KIT INFECTION CTRL ST FRAN --

## (undated) DEVICE — 3M™ IOBAN™ 2 ANTIMICROBIAL INCISE DRAPE 6651EZ: Brand: IOBAN™ 2

## (undated) DEVICE — SURGICAL PROCEDURE PACK BASIN MAJ SET CUST NO CAUT

## (undated) DEVICE — DEVON™ KNEE AND BODY STRAP 60" X 3" (1.5 M X 7.6 CM): Brand: DEVON

## (undated) DEVICE — NEEDLE HYPO 18GA L1.5IN PNK S STL HUB POLYPR SHLD REG BVL

## (undated) DEVICE — REM POLYHESIVE ADULT PATIENT RETURN ELECTRODE: Brand: VALLEYLAB

## (undated) DEVICE — Z DISCONTINUED PER MEDLINE LINE GAS SAMPLING O2/CO2 LNG AD 13 FT NSL W/ TBNG FILTERLINE

## (undated) DEVICE — TOWEL SURG W17XL27IN STD BLU COT NONFENESTRATED PREWASHED

## (undated) DEVICE — SYRINGE MED 20ML STD CLR PLAS LUERLOCK TIP N CTRL DISP

## (undated) DEVICE — SYR 3ML LL TIP 1/10ML GRAD --

## (undated) DEVICE — SUT PROL 4-0 36IN RB1 DA BLU --

## (undated) DEVICE — BLADE ASSEMB CLP HAIR FINE --

## (undated) DEVICE — DBD-PACK,LAPAROTOMY,2 REINFORCED GOWNS: Brand: MEDLINE

## (undated) DEVICE — Device

## (undated) DEVICE — CONTAINER SPEC 20 ML LID NEUT BUFF FORMALIN 10 % POLYPR STS

## (undated) DEVICE — SOLUTION LACTATED RINGERS INJECTION USP

## (undated) DEVICE — SOL ANTI-FOG 6ML MEDC -- MEDICHOICE - CONVERT TO 358427

## (undated) DEVICE — SUTURE PROL 2-0 L48IN NONABSORBABLE BLU SH L26MM 1/2 CIR 8533H

## (undated) DEVICE — STERILE POLYISOPRENE POWDER-FREE SURGICAL GLOVES: Brand: PROTEXIS

## (undated) DEVICE — SUT CHRMC 3-0 27IN SH BRN --

## (undated) DEVICE — THIS ADAPTER IS A DOUBLE SEALING FEMALE LUER LOCK ADAPTER WITH A 2-PIECE, COMBINATION COMPRESSION FIT/BARBED CATHETER CONNECTOR. THE ADAPTER IS USED TO CONNECT THE PD CATHETER TO A SOLUTION TRANSFER SET WITH LOCKING CONNECTOR.: Brand: LOCKING TITANIUM ADAPTER FOR PERITONEAL DIALYSIS CATHETER

## (undated) DEVICE — STRAINER URIN CALC RNL MSH -- CONVERT TO ITEM 357634

## (undated) DEVICE — TOWEL 4 PLY TISS 19X30 SUE WHT

## (undated) DEVICE — TRAP SUC MUCOUS 70ML -- MEDICHOICE MEDLINE

## (undated) DEVICE — SET ADMIN 16ML TBNG L100IN 2 Y INJ SITE IV PIGGY BK DISP

## (undated) DEVICE — Device: Brand: MEDEX

## (undated) DEVICE — HANDLE LT SNAP ON ULT DURABLE LENS FOR TRUMPF ALC DISPOSABLE

## (undated) DEVICE — SOLIDIFIER MEDC 1200ML -- CONVERT TO 356117

## (undated) DEVICE — SUTURE VCRL SZ 4-0 L27IN ABSRB UD L19MM PS-2 3/8 CIR PRIM J426H

## (undated) DEVICE — PREP SKN PREVAIL 40ML APPL --